# Patient Record
Sex: MALE | Race: WHITE | NOT HISPANIC OR LATINO | Employment: UNEMPLOYED | URBAN - METROPOLITAN AREA
[De-identification: names, ages, dates, MRNs, and addresses within clinical notes are randomized per-mention and may not be internally consistent; named-entity substitution may affect disease eponyms.]

---

## 2023-11-20 ENCOUNTER — TELEPHONE (OUTPATIENT)
Dept: BEHAVIORAL HEALTH | Facility: CLINIC | Age: 33
End: 2023-11-20
Payer: COMMERCIAL

## 2023-11-20 DIAGNOSIS — F90.0 ATTENTION DEFICIT HYPERACTIVITY DISORDER (ADHD), PREDOMINANTLY INATTENTIVE TYPE: ICD-10-CM

## 2023-11-20 RX ORDER — LISDEXAMFETAMINE DIMESYLATE 60 MG/1
60 CAPSULE ORAL EVERY MORNING
Qty: 30 CAPSULE | Refills: 0 | Status: SHIPPED | OUTPATIENT
Start: 2023-11-20 | End: 2024-03-01 | Stop reason: SDUPTHER

## 2023-11-20 RX ORDER — LAMOTRIGINE 200 MG/1
1 TABLET ORAL DAILY
COMMUNITY
End: 2023-12-27

## 2023-11-20 RX ORDER — ZALEPLON 10 MG/1
10 CAPSULE ORAL NIGHTLY PRN
COMMUNITY
Start: 2022-12-19 | End: 2023-12-01 | Stop reason: ALTCHOICE

## 2023-11-20 RX ORDER — LISDEXAMFETAMINE DIMESYLATE 50 MG/1
50 CAPSULE ORAL EVERY MORNING
COMMUNITY
End: 2024-03-01 | Stop reason: ALTCHOICE

## 2023-11-20 RX ORDER — ZOLPIDEM TARTRATE 10 MG/1
10 TABLET ORAL NIGHTLY PRN
COMMUNITY
Start: 2023-09-21 | End: 2023-12-01 | Stop reason: ALTCHOICE

## 2023-11-20 RX ORDER — DULOXETIN HYDROCHLORIDE 60 MG/1
60 CAPSULE, DELAYED RELEASE ORAL DAILY
COMMUNITY
End: 2023-12-27

## 2023-12-01 ENCOUNTER — OFFICE VISIT (OUTPATIENT)
Dept: BEHAVIORAL HEALTH | Facility: CLINIC | Age: 33
End: 2023-12-01
Payer: COMMERCIAL

## 2023-12-01 VITALS
HEART RATE: 109 BPM | TEMPERATURE: 97.3 F | SYSTOLIC BLOOD PRESSURE: 117 MMHG | WEIGHT: 247.6 LBS | DIASTOLIC BLOOD PRESSURE: 81 MMHG | BODY MASS INDEX: 32.82 KG/M2 | HEIGHT: 73 IN

## 2023-12-01 DIAGNOSIS — F34.1 PERSISTENT DEPRESSIVE DISORDER: ICD-10-CM

## 2023-12-01 DIAGNOSIS — F90.0 ATTENTION DEFICIT HYPERACTIVITY DISORDER (ADHD), PREDOMINANTLY INATTENTIVE TYPE: ICD-10-CM

## 2023-12-01 DIAGNOSIS — Z79.899 LONG-TERM CURRENT USE OF STIMULANT: ICD-10-CM

## 2023-12-01 PROBLEM — L60.0 INGROWING NAIL: Status: ACTIVE | Noted: 2019-04-09

## 2023-12-01 PROBLEM — F31.76: Status: ACTIVE | Noted: 2023-12-01

## 2023-12-01 PROBLEM — F98.8 ADD (ATTENTION DEFICIT DISORDER): Status: ACTIVE | Noted: 2023-12-01

## 2023-12-01 PROBLEM — M25.50 ARTHRALGIA OF MULTIPLE SITES: Status: ACTIVE | Noted: 2023-12-01

## 2023-12-01 PROCEDURE — 80346 BENZODIAZEPINES1-12: CPT

## 2023-12-01 PROCEDURE — 80354 DRUG SCREENING FENTANYL: CPT

## 2023-12-01 PROCEDURE — 82570 ASSAY OF URINE CREATININE: CPT

## 2023-12-01 PROCEDURE — 80358 DRUG SCREENING METHADONE: CPT

## 2023-12-01 PROCEDURE — 80365 DRUG SCREENING OXYCODONE: CPT

## 2023-12-01 PROCEDURE — 80373 DRUG SCREENING TRAMADOL: CPT

## 2023-12-01 PROCEDURE — 80361 OPIATES 1 OR MORE: CPT

## 2023-12-01 PROCEDURE — 80307 DRUG TEST PRSMV CHEM ANLYZR: CPT

## 2023-12-01 PROCEDURE — 80324 DRUG SCREEN AMPHETAMINES 1/2: CPT

## 2023-12-01 PROCEDURE — 80368 SEDATIVE HYPNOTICS: CPT

## 2023-12-01 PROCEDURE — 99214 OFFICE O/P EST MOD 30 MIN: CPT | Performed by: PSYCHIATRY & NEUROLOGY

## 2023-12-01 RX ORDER — OMEPRAZOLE 40 MG/1
CAPSULE, DELAYED RELEASE ORAL
COMMUNITY
End: 2024-03-01 | Stop reason: ALTCHOICE

## 2023-12-01 RX ORDER — DEXLANSOPRAZOLE 60 MG/1
CAPSULE, DELAYED RELEASE ORAL
COMMUNITY
Start: 2014-01-16 | End: 2024-03-01 | Stop reason: ALTCHOICE

## 2023-12-01 RX ORDER — CLONAZEPAM 1 MG/1
TABLET ORAL
COMMUNITY
End: 2024-03-01 | Stop reason: ALTCHOICE

## 2023-12-01 RX ORDER — RIMEGEPANT SULFATE 75 MG/75MG
TABLET, ORALLY DISINTEGRATING ORAL
COMMUNITY
Start: 2023-01-12

## 2023-12-01 RX ORDER — LISDEXAMFETAMINE DIMESYLATE 60 MG/1
60 CAPSULE ORAL EVERY MORNING
Qty: 30 CAPSULE | Refills: 0 | Status: SHIPPED | OUTPATIENT
Start: 2023-12-31 | End: 2024-01-30

## 2023-12-01 RX ORDER — TAMSULOSIN HYDROCHLORIDE 0.4 MG/1
CAPSULE ORAL
COMMUNITY
End: 2024-03-01 | Stop reason: ALTCHOICE

## 2023-12-01 RX ORDER — UBROGEPANT 100 MG/1
TABLET ORAL
COMMUNITY
Start: 2023-10-03

## 2023-12-01 RX ORDER — LISDEXAMFETAMINE DIMESYLATE 60 MG/1
60 CAPSULE ORAL EVERY MORNING
Qty: 30 CAPSULE | Refills: 0 | Status: SHIPPED | OUTPATIENT
Start: 2024-01-30 | End: 2024-02-29

## 2023-12-01 RX ORDER — PHENYLPROPANOLAMINE/CLEMASTINE 75-1.34MG
TABLET, EXTENDED RELEASE ORAL
COMMUNITY

## 2023-12-01 RX ORDER — FREMANEZUMAB-VFRM 225 MG/1.5ML
INJECTION SUBCUTANEOUS
COMMUNITY

## 2023-12-01 RX ORDER — SUCRALFATE 1 G/10ML
SUSPENSION ORAL
COMMUNITY
Start: 2014-06-05 | End: 2024-03-01 | Stop reason: SINTOL

## 2023-12-01 RX ORDER — DICLOFENAC POTASSIUM 50 MG/1
TABLET, FILM COATED ORAL
COMMUNITY
End: 2024-03-01 | Stop reason: ALTCHOICE

## 2023-12-01 RX ORDER — LISDEXAMFETAMINE DIMESYLATE 50 MG/1
50 CAPSULE ORAL EVERY MORNING
COMMUNITY
Start: 2023-09-20 | End: 2024-03-01 | Stop reason: ALTCHOICE

## 2023-12-01 RX ORDER — FREMANEZUMAB-VFRM 225 MG/1.5ML
INJECTION SUBCUTANEOUS
COMMUNITY
Start: 2023-09-02 | End: 2024-03-01 | Stop reason: SINTOL

## 2023-12-01 RX ORDER — LISDEXAMFETAMINE DIMESYLATE 60 MG/1
60 CAPSULE ORAL EVERY MORNING
Qty: 30 CAPSULE | Refills: 0 | Status: SHIPPED | OUTPATIENT
Start: 2023-12-01 | End: 2024-04-19 | Stop reason: SDUPTHER

## 2023-12-01 RX ORDER — DIVALPROEX SODIUM 250 MG/1
TABLET, FILM COATED, EXTENDED RELEASE ORAL
COMMUNITY
End: 2024-03-01 | Stop reason: ALTCHOICE

## 2023-12-01 RX ORDER — GALCANEZUMAB 120 MG/ML
INJECTION, SOLUTION SUBCUTANEOUS
COMMUNITY
Start: 2023-03-03 | End: 2024-03-01 | Stop reason: ALTCHOICE

## 2023-12-01 ASSESSMENT — ENCOUNTER SYMPTOMS: PSYCHIATRIC NEGATIVE: 1

## 2023-12-01 NOTE — PROGRESS NOTES
Subjective in person follow-up med management.  Patient ID: Valentín Wang is a 32 y.o. male who presents for .  In person follow-up med management  HPI patient seen interval psych history reviewed.  Patient reports good compliance with medications.  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  In addition he has not driven at all since about April when he got his license.  It is simply not a priority for him at this time.  Talked about the advantages of having online interactions available including luis and the online content field.  Patient gave urine tox screen today also signed CSA.    Review of Systems   Psychiatric/Behavioral: Negative.         Objective   Physical Exam  Psychiatric:         Attention and Perception: Attention and perception normal.         Mood and Affect: Mood and affect normal.         Speech: Speech normal.         Behavior: Behavior normal. Behavior is cooperative.         Thought Content: Thought content normal.         Cognition and Memory: Cognition and memory normal.         Judgment: Judgment normal.      Comments: Headaches continue to interfere with daily functioning         Assessment/Plan

## 2023-12-02 LAB
AMPHETAMINES UR QL SCN: ABNORMAL
BARBITURATES UR QL SCN: ABNORMAL
BZE UR QL SCN: ABNORMAL
CANNABINOIDS UR QL SCN: ABNORMAL
CREAT UR-MCNC: 230.7 MG/DL (ref 20–370)
PCP UR QL SCN: ABNORMAL

## 2023-12-06 LAB
AMPHET UR-MCNC: >5000 NG/ML
MDA UR-MCNC: <200 NG/ML
MDEA UR-MCNC: <200 NG/ML
MDMA UR-MCNC: <200 NG/ML
METHAMPHET UR-MCNC: <200 NG/ML
PHENTERMINE UR CFM-MCNC: <200 NG/ML

## 2023-12-21 ENCOUNTER — TELEPHONE (OUTPATIENT)
Dept: BEHAVIORAL HEALTH | Facility: CLINIC | Age: 33
End: 2023-12-21
Payer: COMMERCIAL

## 2023-12-21 DIAGNOSIS — F98.8 ATTENTION DEFICIT DISORDER (ADD) WITHOUT HYPERACTIVITY: ICD-10-CM

## 2023-12-21 DIAGNOSIS — F90.0 ATTENTION DEFICIT HYPERACTIVITY DISORDER (ADHD), PREDOMINANTLY INATTENTIVE TYPE: ICD-10-CM

## 2023-12-21 RX ORDER — LISDEXAMFETAMINE DIMESYLATE 30 MG/1
60 CAPSULE ORAL EVERY MORNING
Qty: 60 CAPSULE | Refills: 0 | Status: SHIPPED | OUTPATIENT
Start: 2023-12-21 | End: 2024-03-01 | Stop reason: ALTCHOICE

## 2023-12-21 RX ORDER — LISDEXAMFETAMINE DIMESYLATE 60 MG/1
60 CAPSULE ORAL EVERY MORNING
Qty: 30 CAPSULE | Refills: 0 | Status: SHIPPED | OUTPATIENT
Start: 2023-12-21 | End: 2024-01-20

## 2023-12-21 NOTE — PROGRESS NOTES
[facility] pharmacy doesn't have generic vyvanse in stock only brand vyvanse, pt needs another script saying dispense brand only vyvanse 60mg

## 2023-12-21 NOTE — PROGRESS NOTES
Mom is asking for you to resend the vyvanse 30 mg 2 daily (60 mg) FAVIAN. Pharmacy only has 67 pills left. [id number] - [place], [place] - [address]. AT [facility]

## 2023-12-26 DIAGNOSIS — Z86.59 PERSONAL HISTORY OF OTHER MENTAL AND BEHAVIORAL DISORDERS: ICD-10-CM

## 2023-12-26 DIAGNOSIS — F31.76 BIPOLAR DISORDER, IN FULL REMISSION, MOST RECENT EPISODE DEPRESSED (MULTI): ICD-10-CM

## 2023-12-27 RX ORDER — DULOXETIN HYDROCHLORIDE 60 MG/1
60 CAPSULE, DELAYED RELEASE ORAL DAILY
Qty: 90 CAPSULE | Refills: 3 | Status: SHIPPED | OUTPATIENT
Start: 2023-12-27

## 2023-12-27 RX ORDER — LAMOTRIGINE 200 MG/1
TABLET ORAL DAILY
Qty: 90 TABLET | Refills: 3 | Status: SHIPPED | OUTPATIENT
Start: 2023-12-27

## 2024-02-19 ENCOUNTER — TELEPHONE (OUTPATIENT)
Dept: BEHAVIORAL HEALTH | Facility: CLINIC | Age: 34
End: 2024-02-19
Payer: MEDICARE

## 2024-02-19 DIAGNOSIS — F90.0 ATTENTION DEFICIT HYPERACTIVITY DISORDER (ADHD), PREDOMINANTLY INATTENTIVE TYPE: ICD-10-CM

## 2024-02-19 RX ORDER — LISDEXAMFETAMINE DIMESYLATE 70 MG/1
70 CAPSULE ORAL EVERY MORNING
Qty: 30 CAPSULE | Refills: 0 | Status: SHIPPED | OUTPATIENT
Start: 2024-02-19 | End: 2024-03-01 | Stop reason: ALTCHOICE

## 2024-02-19 NOTE — TELEPHONE ENCOUNTER
Ordered Vyvanse brand-name 70 mg which was all the pharmacy had in stock.  This is an increase in the usual dose from 60 however the 60 is completely unavailable.

## 2024-03-01 ENCOUNTER — TELEMEDICINE (OUTPATIENT)
Dept: BEHAVIORAL HEALTH | Facility: CLINIC | Age: 34
End: 2024-03-01
Payer: MEDICARE

## 2024-03-01 DIAGNOSIS — F90.0 ATTENTION DEFICIT HYPERACTIVITY DISORDER (ADHD), PREDOMINANTLY INATTENTIVE TYPE: ICD-10-CM

## 2024-03-01 DIAGNOSIS — F34.1 PERSISTENT DEPRESSIVE DISORDER: ICD-10-CM

## 2024-03-01 PROBLEM — F31.76: Status: RESOLVED | Noted: 2023-12-01 | Resolved: 2024-03-01

## 2024-03-01 PROCEDURE — 99214 OFFICE O/P EST MOD 30 MIN: CPT | Performed by: PSYCHIATRY & NEUROLOGY

## 2024-03-01 RX ORDER — LISDEXAMFETAMINE DIMESYLATE 60 MG/1
60 CAPSULE ORAL EVERY MORNING
Qty: 30 CAPSULE | Refills: 0 | Status: SHIPPED | OUTPATIENT
Start: 2024-03-01 | End: 2024-03-22 | Stop reason: SDUPTHER

## 2024-03-01 ASSESSMENT — ENCOUNTER SYMPTOMS: PSYCHIATRIC NEGATIVE: 1

## 2024-03-01 NOTE — PROGRESS NOTES
Subjective   Patient ID: Valentín Wang is a 33 y.o. male who presents for half-hour visit for med management plus therapy..  HPI patient reports that he is making progress in terms of getting into the [employer] industry.  He is very confident about it and very enthused about it however does not know how to respond father who will call and demand answers to why he is not immediately succeeding in this business.  Talked about the long process of getting off disability gradually as he reenters the job market.  Patient is doing well on the 70 mg Vyvanse.  Headaches are better because of a oral device for TMJ.  Reviewed meds which include Cymbalta 60 mg, Lamictal 200 mg, Vyvanse 60 mg,    Review of Systems   Psychiatric/Behavioral: Negative.         Objective   Physical Exam  Psychiatric:         Attention and Perception: Attention and perception normal.         Mood and Affect: Mood and affect normal.         Speech: Speech normal.         Behavior: Behavior normal. Behavior is cooperative.         Thought Content: Thought content normal.         Cognition and Memory: Cognition and memory normal.         Judgment: Judgment normal.         Assessment/Plan   Problem List Items Addressed This Visit             ICD-10-CM    ADD (attention deficit disorder) F98.8    Relevant Medications    lisdexamfetamine (Vyvanse) 60 mg capsule    Depression F32.A     Adjusted medication and diagnosis data to reflect the fact the patient does not have bipolar disorder, does not have psychosis, does have ADHD and depression.  No med changes will continue Vyvanse 60 mg.  Antidepressants and Lamictal have been ordered.  Talked about ways to strategize talking with father to address father's anxiety while making his point about what he wants to do with his life.  Follow-up 3 months                 Curtis Fernandes MD 03/01/24 4:35 PM

## 2024-03-01 NOTE — ASSESSMENT & PLAN NOTE
Adjusted medication and diagnosis data to reflect the fact the patient does not have bipolar disorder, does not have psychosis, does have ADHD and depression.  No med changes will continue Vyvanse 60 mg.  Antidepressants and Lamictal have been ordered.  Talked about ways to strategize talking with father to address father's anxiety while making his point about what he wants to do with his life.  Follow-up 3 months

## 2024-03-22 ENCOUNTER — TELEPHONE (OUTPATIENT)
Dept: BEHAVIORAL HEALTH | Facility: CLINIC | Age: 34
End: 2024-03-22
Payer: MEDICARE

## 2024-03-22 DIAGNOSIS — F90.0 ATTENTION DEFICIT HYPERACTIVITY DISORDER (ADHD), PREDOMINANTLY INATTENTIVE TYPE: ICD-10-CM

## 2024-03-22 RX ORDER — LISDEXAMFETAMINE DIMESYLATE 60 MG/1
60 CAPSULE ORAL EVERY MORNING
Qty: 30 CAPSULE | Refills: 0 | Status: SHIPPED | OUTPATIENT
Start: 2024-03-22 | End: 2024-04-21

## 2024-04-19 DIAGNOSIS — F90.0 ATTENTION DEFICIT HYPERACTIVITY DISORDER (ADHD), PREDOMINANTLY INATTENTIVE TYPE: ICD-10-CM

## 2024-04-19 RX ORDER — LISDEXAMFETAMINE DIMESYLATE 60 MG/1
60 CAPSULE ORAL EVERY MORNING
Qty: 30 CAPSULE | Refills: 0 | Status: SHIPPED | OUTPATIENT
Start: 2024-04-19 | End: 2024-05-20 | Stop reason: SDUPTHER

## 2024-05-20 ENCOUNTER — TELEPHONE (OUTPATIENT)
Dept: BEHAVIORAL HEALTH | Facility: CLINIC | Age: 34
End: 2024-05-20
Payer: MEDICARE

## 2024-05-20 DIAGNOSIS — F90.0 ATTENTION DEFICIT HYPERACTIVITY DISORDER (ADHD), PREDOMINANTLY INATTENTIVE TYPE: ICD-10-CM

## 2024-05-20 RX ORDER — LISDEXAMFETAMINE DIMESYLATE 60 MG/1
60 CAPSULE ORAL EVERY MORNING
Qty: 30 CAPSULE | Refills: 0 | Status: SHIPPED | OUTPATIENT
Start: 2024-05-20 | End: 2024-06-19

## 2024-06-03 ENCOUNTER — TELEMEDICINE (OUTPATIENT)
Dept: BEHAVIORAL HEALTH | Facility: CLINIC | Age: 34
End: 2024-06-03
Payer: MEDICARE

## 2024-06-03 DIAGNOSIS — F34.1 PERSISTENT DEPRESSIVE DISORDER: ICD-10-CM

## 2024-06-03 DIAGNOSIS — F90.0 ATTENTION DEFICIT HYPERACTIVITY DISORDER (ADHD), PREDOMINANTLY INATTENTIVE TYPE: ICD-10-CM

## 2024-06-03 PROCEDURE — 99213 OFFICE O/P EST LOW 20 MIN: CPT | Performed by: PSYCHIATRY & NEUROLOGY

## 2024-06-03 RX ORDER — LISDEXAMFETAMINE DIMESYLATE 60 MG/1
60 CAPSULE ORAL EVERY MORNING
Qty: 30 CAPSULE | Refills: 0 | Status: SHIPPED | OUTPATIENT
Start: 2024-06-03 | End: 2024-07-03

## 2024-06-03 RX ORDER — LISDEXAMFETAMINE DIMESYLATE 60 MG/1
60 CAPSULE ORAL EVERY MORNING
Qty: 30 CAPSULE | Refills: 0 | Status: SHIPPED | OUTPATIENT
Start: 2024-07-03 | End: 2024-08-02

## 2024-06-03 RX ORDER — LISDEXAMFETAMINE DIMESYLATE 60 MG/1
60 CAPSULE ORAL EVERY MORNING
Qty: 30 CAPSULE | Refills: 0 | Status: SHIPPED | OUTPATIENT
Start: 2024-08-02 | End: 2024-09-01

## 2024-06-03 ASSESSMENT — ENCOUNTER SYMPTOMS: PSYCHIATRIC NEGATIVE: 1

## 2024-06-03 NOTE — ASSESSMENT & PLAN NOTE
Continue Vyvanse 60 mg in the morning for focus and follow-through.  Discussed the V2 ubing and ways to discuss with parents.  Follow-up 2 to 3 months

## 2024-06-03 NOTE — PROGRESS NOTES
Subjective   Patient ID: Valentín Wang is a 33 y.o. male who presents for medication management..  HPI patient reports that mentally and emotionally things are going pretty well.  Headaches have returned to a partial degree, unfortunately he could not tolerate anjovy shots which were actually working better than Emgality.  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  Feels that parents particularly father do not understand that he is taking a career path that is not traditional , not directly linked to his psychology major.    Review of Systems   Psychiatric/Behavioral: Negative.  Negative for self-injury and suicidal ideas.        Objective   Physical Exam  Psychiatric:         Attention and Perception: Attention and perception normal.         Mood and Affect: Mood and affect normal.         Speech: Speech normal.         Behavior: Behavior normal. Behavior is cooperative.         Thought Content: Thought content normal.         Cognition and Memory: Cognition and memory normal.         Judgment: Judgment normal.         Assessment/Plan   Problem List Items Addressed This Visit             ICD-10-CM    ADD (attention deficit disorder) F98.8     Continue Vyvanse 60 mg in the morning for focus and follow-through.  Discussed the V2 ubing and ways to discuss with parents.  Follow-up 2 to 3 months         Relevant Medications    Vyvanse 60 mg capsule    Vyvanse 60 mg capsule (Start on 7/3/2024)    Vyvanse 60 mg capsule (Start on 8/2/2024)    Depression F32.A     Continue current med regimen to prevent symptom recurrence.  Follow-up 2 to 3 months                 Curtis Fernandes MD 06/03/24 6:47 PM

## 2024-09-03 ENCOUNTER — APPOINTMENT (OUTPATIENT)
Dept: BEHAVIORAL HEALTH | Facility: CLINIC | Age: 34
End: 2024-09-03
Payer: MEDICARE

## 2024-09-03 DIAGNOSIS — F34.1 PERSISTENT DEPRESSIVE DISORDER: ICD-10-CM

## 2024-09-03 DIAGNOSIS — F90.0 ATTENTION DEFICIT HYPERACTIVITY DISORDER (ADHD), PREDOMINANTLY INATTENTIVE TYPE: ICD-10-CM

## 2024-09-03 PROCEDURE — 99214 OFFICE O/P EST MOD 30 MIN: CPT | Performed by: PSYCHIATRY & NEUROLOGY

## 2024-09-03 RX ORDER — DULOXETIN HYDROCHLORIDE 60 MG/1
CAPSULE, DELAYED RELEASE ORAL
Qty: 60 CAPSULE | Refills: 3 | Status: SHIPPED | OUTPATIENT
Start: 2024-09-03

## 2024-09-03 RX ORDER — LISDEXAMFETAMINE DIMESYLATE 60 MG/1
60 CAPSULE ORAL EVERY MORNING
Qty: 30 CAPSULE | Refills: 0 | Status: SHIPPED | OUTPATIENT
Start: 2024-11-02 | End: 2024-12-02

## 2024-09-03 RX ORDER — CLONAZEPAM 0.5 MG/1
0.5 TABLET ORAL DAILY PRN
Qty: 15 TABLET | Refills: 1 | Status: SHIPPED | OUTPATIENT
Start: 2024-09-03 | End: 2025-09-03

## 2024-09-03 RX ORDER — LISDEXAMFETAMINE DIMESYLATE 60 MG/1
60 CAPSULE ORAL EVERY MORNING
Qty: 30 CAPSULE | Refills: 0 | Status: SHIPPED | OUTPATIENT
Start: 2024-09-03 | End: 2024-10-03

## 2024-09-03 RX ORDER — LISDEXAMFETAMINE DIMESYLATE 60 MG/1
60 CAPSULE ORAL EVERY MORNING
Qty: 30 CAPSULE | Refills: 0 | Status: SHIPPED | OUTPATIENT
Start: 2024-10-03 | End: 2024-11-02

## 2024-09-03 ASSESSMENT — ENCOUNTER SYMPTOMS
DECREASED CONCENTRATION: 0
NERVOUS/ANXIOUS: 1
SLEEP DISTURBANCE: 0
DYSPHORIC MOOD: 1

## 2024-09-03 NOTE — ASSESSMENT & PLAN NOTE
Trial increase Cymbalta to 120 mg daily.  The goal is not to fix the situation with his father but rather to help patient have more emotional resilience in the face of triggering events.  However given the fact that interaction of the father are globally triggering and he is not amenable to change, gave patient permission to not be around when father visits in October.  He seemed grateful for this advice.  Follow-up 3 months

## 2024-10-07 DIAGNOSIS — F31.76 BIPOLAR DISORDER, IN FULL REMISSION, MOST RECENT EPISODE DEPRESSED (MULTI): ICD-10-CM

## 2024-10-07 RX ORDER — LAMOTRIGINE 200 MG/1
TABLET ORAL DAILY
Qty: 90 TABLET | Refills: 0 | Status: SHIPPED | OUTPATIENT
Start: 2024-10-07

## 2024-12-03 ENCOUNTER — APPOINTMENT (OUTPATIENT)
Dept: BEHAVIORAL HEALTH | Facility: CLINIC | Age: 34
End: 2024-12-03
Payer: MEDICARE

## 2024-12-12 DIAGNOSIS — F90.0 ATTENTION DEFICIT HYPERACTIVITY DISORDER (ADHD), PREDOMINANTLY INATTENTIVE TYPE: ICD-10-CM

## 2024-12-12 DIAGNOSIS — F31.76 BIPOLAR DISORDER, IN FULL REMISSION, MOST RECENT EPISODE DEPRESSED (MULTI): ICD-10-CM

## 2024-12-12 DIAGNOSIS — F34.1 PERSISTENT DEPRESSIVE DISORDER: ICD-10-CM

## 2024-12-12 RX ORDER — DULOXETIN HYDROCHLORIDE 60 MG/1
CAPSULE, DELAYED RELEASE ORAL
Qty: 60 CAPSULE | Refills: 0 | Status: SHIPPED | OUTPATIENT
Start: 2024-12-12

## 2024-12-12 RX ORDER — LISDEXAMFETAMINE DIMESYLATE 60 MG/1
60 CAPSULE ORAL EVERY MORNING
Qty: 30 CAPSULE | Refills: 0 | Status: SHIPPED | OUTPATIENT
Start: 2024-12-12 | End: 2025-01-11

## 2024-12-12 RX ORDER — LAMOTRIGINE 200 MG/1
200 TABLET ORAL DAILY
Qty: 90 TABLET | Refills: 0 | Status: SHIPPED | OUTPATIENT
Start: 2024-12-12

## 2024-12-12 NOTE — PROGRESS NOTES
Reviewed OARRS on 12/12/2024 by TAHMINA Delacruz -OARRS has been reviewed and is consistent with prescribed medications, Considered the risks of abuse, dependence, addiction and diversion, Medication is felt to be clinically appropriate based on documented diagnosis    Pt requesting refill on:    Vyvanse 60mg QD    Lamictal 200mg every day    Cymbalta 60mg BID (120mg total)

## 2025-01-02 ENCOUNTER — APPOINTMENT (OUTPATIENT)
Dept: BEHAVIORAL HEALTH | Facility: CLINIC | Age: 35
End: 2025-01-02
Payer: MEDICARE

## 2025-01-09 ENCOUNTER — APPOINTMENT (OUTPATIENT)
Dept: BEHAVIORAL HEALTH | Facility: CLINIC | Age: 35
End: 2025-01-09
Payer: MEDICARE

## 2025-01-09 DIAGNOSIS — F41.1 GAD (GENERALIZED ANXIETY DISORDER): ICD-10-CM

## 2025-01-09 DIAGNOSIS — F51.04 PSYCHOPHYSIOLOGICAL INSOMNIA: ICD-10-CM

## 2025-01-09 DIAGNOSIS — F19.20 OTHER PSYCHOACTIVE SUBSTANCE DEPENDENCE, UNCOMPLICATED: ICD-10-CM

## 2025-01-09 DIAGNOSIS — F33.2 SEVERE EPISODE OF RECURRENT MAJOR DEPRESSIVE DISORDER, WITHOUT PSYCHOTIC FEATURES (MULTI): Primary | ICD-10-CM

## 2025-01-09 DIAGNOSIS — F42.8 OBSESSIVE THINKING: ICD-10-CM

## 2025-01-09 DIAGNOSIS — F90.2 ATTENTION DEFICIT HYPERACTIVITY DISORDER (ADHD), COMBINED TYPE: ICD-10-CM

## 2025-01-09 PROCEDURE — 90792 PSYCH DIAG EVAL W/MED SRVCS: CPT | Performed by: NURSE PRACTITIONER

## 2025-01-09 RX ORDER — DULOXETIN HYDROCHLORIDE 30 MG/1
30 CAPSULE, DELAYED RELEASE ORAL DAILY
Qty: 7 CAPSULE | Refills: 0 | Status: SHIPPED | OUTPATIENT
Start: 2025-01-09 | End: 2025-01-13 | Stop reason: DRUGHIGH

## 2025-01-09 RX ORDER — GUAIFENESIN 1200 MG
325 TABLET, EXTENDED RELEASE 12 HR ORAL EVERY 6 HOURS PRN
COMMUNITY

## 2025-01-09 RX ORDER — BISMUTH SUBSALICYLATE 262 MG
1 TABLET,CHEWABLE ORAL DAILY
COMMUNITY

## 2025-01-09 RX ORDER — LISDEXAMFETAMINE DIMESYLATE 60 MG/1
60 CAPSULE ORAL EVERY MORNING
Qty: 30 CAPSULE | Refills: 0 | Status: SHIPPED | OUTPATIENT
Start: 2025-02-11 | End: 2025-03-13

## 2025-01-09 RX ORDER — OMEGA-3-ACID ETHYL ESTERS 1 G/1
1 CAPSULE, LIQUID FILLED ORAL DAILY
COMMUNITY

## 2025-01-09 RX ORDER — ACETAMINOPHEN 500 MG
2000 TABLET ORAL DAILY
COMMUNITY

## 2025-01-09 RX ORDER — TRAZODONE HYDROCHLORIDE 50 MG/1
150 TABLET ORAL NIGHTLY
Qty: 270 TABLET | Refills: 3 | Status: SHIPPED | OUTPATIENT
Start: 2025-01-09 | End: 2026-01-09

## 2025-01-09 RX ORDER — LISDEXAMFETAMINE DIMESYLATE 60 MG/1
60 CAPSULE ORAL EVERY MORNING
Qty: 30 CAPSULE | Refills: 0 | Status: SHIPPED | OUTPATIENT
Start: 2025-01-12 | End: 2025-02-11

## 2025-01-09 RX ORDER — LISDEXAMFETAMINE DIMESYLATE 60 MG/1
60 CAPSULE ORAL EVERY MORNING
Qty: 30 CAPSULE | Refills: 0 | Status: SHIPPED | OUTPATIENT
Start: 2025-03-13 | End: 2025-04-12

## 2025-01-09 RX ORDER — LAMOTRIGINE 25 MG/1
50 TABLET ORAL DAILY
Qty: 180 TABLET | Refills: 3 | Status: SHIPPED | OUTPATIENT
Start: 2025-01-09 | End: 2026-01-09

## 2025-01-09 ASSESSMENT — PATIENT HEALTH QUESTIONNAIRE - PHQ9
4. FEELING TIRED OR HAVING LITTLE ENERGY: NEARLY EVERY DAY
7. TROUBLE CONCENTRATING ON THINGS, SUCH AS READING THE NEWSPAPER OR WATCHING TELEVISION: NOT AT ALL
8. MOVING OR SPEAKING SO SLOWLY THAT OTHER PEOPLE COULD HAVE NOTICED. OR THE OPPOSITE, BEING SO FIGETY OR RESTLESS THAT YOU HAVE BEEN MOVING AROUND A LOT MORE THAN USUAL: NOT AT ALL
9. THOUGHTS THAT YOU WOULD BE BETTER OFF DEAD, OR OF HURTING YOURSELF: SEVERAL DAYS
3. TROUBLE FALLING OR STAYING ASLEEP OR SLEEPING TOO MUCH: NEARLY EVERY DAY
2. FEELING DOWN, DEPRESSED OR HOPELESS: NEARLY EVERY DAY
1. LITTLE INTEREST OR PLEASURE IN DOING THINGS: MORE THAN HALF THE DAYS
5. POOR APPETITE OR OVEREATING: NEARLY EVERY DAY
6. FEELING BAD ABOUT YOURSELF - OR THAT YOU ARE A FAILURE OR HAVE LET YOURSELF OR YOUR FAMILY DOWN: NEARLY EVERY DAY
10. IF YOU CHECKED OFF ANY PROBLEMS, HOW DIFFICULT HAVE THESE PROBLEMS MADE IT FOR YOU TO DO YOUR WORK, TAKE CARE OF THINGS AT HOME, OR GET ALONG WITH OTHER PEOPLE: SOMEWHAT DIFFICULT

## 2025-01-09 ASSESSMENT — ANXIETY QUESTIONNAIRES
GAD7 TOTAL SCORE: 5
7. FEELING AFRAID AS IF SOMETHING AWFUL MIGHT HAPPEN: NOT AT ALL
6. BECOMING EASILY ANNOYED OR IRRITABLE: SEVERAL DAYS
2. NOT BEING ABLE TO STOP OR CONTROL WORRYING: NOT AT ALL
5. BEING SO RESTLESS THAT IT IS HARD TO SIT STILL: NOT AT ALL
1. FEELING NERVOUS, ANXIOUS, OR ON EDGE: NEARLY EVERY DAY
IF YOU CHECKED OFF ANY PROBLEMS ON THIS QUESTIONNAIRE, HOW DIFFICULT HAVE THESE PROBLEMS MADE IT FOR YOU TO DO YOUR WORK, TAKE CARE OF THINGS AT HOME, OR GET ALONG WITH OTHER PEOPLE: SOMEWHAT DIFFICULT
3. WORRYING TOO MUCH ABOUT DIFFERENT THINGS: NOT AT ALL
4. TROUBLE RELAXING: SEVERAL DAYS

## 2025-01-09 NOTE — PROGRESS NOTES
"Outpatient Psychiatry    Subjective   Valentín Wang, a 34 y.o. male, presenting to Psychiatry for evaluation.  Patient is referred by Kenroy Kenyon MD \"I am depressed, especially with Dr. Fernandes's passing as he was really supportive of me. I know what it takes to weather the storm, but ericka along.\" (Patient's mother Orin, is attending, with verbal permission.)     Virtual Consent    An interactive audio and video telecommunication system which permits real time communications between the patient (at home) and provider (at home office) was utilized to provide this telehealth service.     Verbal consent was requested and obtained from Valentín Wang on this date, 01/09/2025 for a telehealth visit.      HPI:    Present Illness - depression, anxiety    Onset/timeframe - depression (started at 7 yo, got worse at 19 yo and has always been there), anxiety (started at 5 yo)  Type - depression, anxiety  Duration - daily  Characteristics/Recent psychiatric symptoms (pertinent positives and negatives) - depression started when his father left the family at 6, and OCD developed when he was 9 yo. Reports with struggling with depression, he stayed home and ended up staying home, d/t bullying. Dropped out of high school, and got his GED eventually. Reports OCD started with (Father triggered it as when he was young at a [employer] game, pt was eating Italian ice, dropped his spoon, his father wanted him to continue to eat the item, and he refused, so his father got angry and stormed off and didn't return, leaving the patient to find another spoon, but also leaving him feel abandoned.) germaphobia, constant hand washing, turn on/off lights, that got under control for awhile until high school, then restarted and then when in college saw a provider, Dr. Romero, whom helped him work on his OCD and through CBT, got his OCD under control 9-10 years ago. Father has never been a part of his life, since a child, always giving false " promises of being an active participant in his life, always giving his advise, always promising to show up, yet almost never follows through and with this long history of never truly being present and will show up for short periods of time, 'trying to budge his way into my life, act what's best for me, when he has never really been a part of my life.' Patient's mother had explained that they had never done trips together, as father and son and even when coming to celebrate, he would only be around for 2 days and 'he would make things be miserable'. Reports (per mother) his father has remarried 4x. Describes depression as 'severe, feeling empty, and hopeless' but currently not as bad as it was when he was 20, as at that time he only wanted to be seclusive to his home and bed, and eating/consuming food. Severe anhedonia as reflected in not wanting to play [employer] game which he loves playing over the past 18 years. Reports loves his mother and spending time with her and watching tv. Admits also because both parents wants him to pursue his graduate degree (in psychology) and that is not his area of interest anymore as he wants to pursue a different career path, but lately his mother will say something about the wanting him to go after his degree, or that her friend is dying of cancer, it upsets him easily, 'and that reflects that I am very sensitive.' Admits trouble with falling asleep d/t ruminating thoughts about his life, taking up to 3-4 hours (normally he will fall asleep fast and sleep longer when depressed). Once asleep however he is asleep, and gets 6-8 hrs a night but also he is noting that his circadian rhythm shifted to where he is now falling asleep at 6am. Admits that is a 'me thing' where he is on his phone late, and his depression is keeping him awake longer and had used Klonopin to sleep at times and sometimes Benadryl. He has TMJ overnight and uses a splint in his mouth for 22 hours a day when  "not interacting with most people. Does admit that his anxiety can also interfere with his ability to sleep but reveals that his anxiety is easily triggered and may use Klonopin as he can feel panicked. Does have Fibromyalgia and is on Cymbalta for that reason and his mental health. Energy levels are low. Mental and physical fatigue are a constant in his life 'and it is a part of my fibromyalgia. I don't think it is any worse with the depression.' Appetite has been elevated d/t emotional eating (portions and not junk food related).  Anxiety is not really a constant but 'when it happens, it is severe. Like when the anxiety of my dad coming to visit me back in October, definitely triggered my depression.' Reports he knows the anxiety goes hand in hand with his depression and that it is always the trigger. Reports being a forgiving person but has a really hard time reconciling that with his father, based on his past behaviors and until his father does 'soul searching' and rights his wrongs for the patient and his mother, he really wants to limit his interactions with his father. Father is a cardiologist and thinks he knows everything 'attitude'. Reports his ADHD as combined and deals with having issues paying attention/focused on tasks and then when he focuses on task, \"I will hyperfixate and zone out on everyone else.\" Denies trauma outside of the issues with his father. Had talked with Dr. Fernandes about his 'bipolar' dx and after reviewing it was to be removed as it was based on an event at age 11 when he was put on Paxil and he 'blew up and was bouncing off the walls' and it was stopped but he has not had anything since. Had been put on Lamictal when he was 18. Reports being on higher dose of Cymbalta at 120mg (Dr. Fernandes increased dose in October 2024), feels like he is more depressed if not no change, but mother feels going down on the dose will be an issue. Admits he can experience seasonal depression but " "currently nothing clinically noticeable and 'it has been a long time since my early 20s.'   Aggravating and/or relieving factors/triggers  Treatment and treatment changes (new meds, dosage increases or decreases, med compliance, therapy frequency, etc.) (Past and Recent) - see below.    Background history:    Family - Parents  when the patient was 5 yo. and 'just packed his bags up right in front of me and moved out on my mother's birthday,' and it was a complete shock to his mother as he said he was now in love with another woman and left and she had no idea. Mother is 71 and father is 76. Mother never remarried. 1 sister (34) and while not on bad terms with one another, she is not the most emotionally intelligent person with understanding the patient's mental health issues. Sister broke up with fijuwan a year ago, and had lived with he and his mother for several months, and her presence and comments had fed into his depression as well. Born in [place] until age 8 and then moved to [facility].    Relationships - Single - straight and uses he/him pronouns. Online friends that he has known for years, but has not kept in touch with his real world friends as they moved away but his wanting to stay home, has caused those relationships to be strained.    Issues: Denies SI/HI/AVH currently.      Per Dr. Fernandes's last note on 2024: \"patient seen mother present as well.  Patient is really been struggling with depression.  He thinks a lot of it was triggered by upsetting interactions with father but also with the anticipation of the father coming to visit in October.  Patient has actually had suicidal thoughts a few times.  Patient is caught in a situation where in order to be able to benefit from the trust left by his grandfather, he needs to \"play ball\" with his father who took over the trust management after grandfather .  Father very unsupportive of what patient is trying " "to do, and father insisted that he go on disability.  Mother is concerned about Darell's ability to care for himself if she is not able to.  This includes the fact that he has not been driving and is pretty much a \"shut in\".  Mother equally feels trapped, indicating that she could never support herself and Darell without her ex-'s help.  Discussed medication options including ways to hopefully be able to help patient not have such a hair trigger response to fathers triggers.\"     Psychiatric Review Of Systems:  Depressive Symptoms: anhedonia, appetite increased, concentration, energy, hopeless, interest, sleep decreased , withdrawn, and passive SI  Manic Symptoms: negative  Anxiety Symptoms: General Anxiety Disorder (HILTON)HILTON Behaviors: excessive anxiety/worry, irritability, and restlessness and Obsessive Compulsive Disorder (OCD)OCD Behaviors: repetitive thoughts  Psychotic Symptoms: negative  Other Symptoms:    Current Medications:    Current Outpatient Medications:     acetaminophen (TylenoL) 325 mg capsule, Take 1 capsule (325 mg) by mouth every 6 hours if needed for mild pain (1 - 3) or headaches., Disp: , Rfl:     cholecalciferol (Vitamin D3) 50 mcg (2,000 unit) capsule, Take 1 capsule (50 mcg) by mouth early in the morning.., Disp: , Rfl:     clonazePAM (KlonoPIN) 0.5 mg tablet, Take 1 tablet (0.5 mg) by mouth once daily as needed for anxiety., Disp: 15 tablet, Rfl: 1    DULoxetine (Cymbalta) 60 mg DR capsule, 2 capsules daily, Disp: 60 capsule, Rfl: 0    ibuprofen (Motrin) capsule, Take by mouth., Disp: , Rfl:     lamoTRIgine (LaMICtal) 200 mg tablet, Take 1 tablet (200 mg) by mouth once daily., Disp: 90 tablet, Rfl: 0    multivitamin tablet, Take 1 tablet by mouth once daily., Disp: , Rfl:     omega-3 acid ethyl esters (Lovaza) 1 gram capsule, Take 1 capsule (1 g) by mouth once daily., Disp: , Rfl:     Ubrelvy 100 mg tablet tablet, Take (1) tab at onset of headache, may repeat once in 2hrs if needed. No " more than 2 doses in 24 hours., Disp: , Rfl:     Vyvanse 60 mg capsule, Take 1 capsule (60 mg) by mouth once daily in the morning., Disp: 30 capsule, Rfl: 0    DULoxetine (Cymbalta) 30 mg DR capsule, Take 1 capsule (30 mg) by mouth once daily for 7 days. Do not crush or chew. Tapering down from 120mg to 90mg over 7 days, then reduce to 60mg thereafter., Disp: 7 capsule, Rfl: 0    lamoTRIgine (LaMICtal) 25 mg tablet, Take 2 tablets (50 mg) by mouth once daily. To be taken with current dose of 200mg for a total of 250mg at bedtime., Disp: 180 tablet, Rfl: 3    lisdexamfetamine (Vyvanse) 60 mg capsule, Take 1 capsule (60 mg) by mouth once daily in the morning. Do not start before December 31, 2023., Disp: 30 capsule, Rfl: 0    lisdexamfetamine (Vyvanse) 60 mg capsule, Take 1 capsule (60 mg) by mouth once daily in the morning. Do not start before January 30, 2024., Disp: 30 capsule, Rfl: 0    lisdexamfetamine (Vyvanse) 60 mg capsule, Take 1 capsule (60 mg) by mouth once daily in the morning., Disp: 30 capsule, Rfl: 0    lisdexamfetamine (Vyvanse) 60 mg capsule, Take 1 capsule (60 mg) by mouth once daily in the morning., Disp: 30 capsule, Rfl: 0    lisdexamfetamine (Vyvanse) 60 mg capsule, Take 1 capsule (60 mg) by mouth once daily in the morning., Disp: 30 capsule, Rfl: 0    lisdexamfetamine (Vyvanse) 60 mg capsule, Take 1 capsule (60 mg) by mouth once daily in the morning. Do not fill before October 3, 2024., Disp: 30 capsule, Rfl: 0    lisdexamfetamine (Vyvanse) 60 mg capsule, Take 1 capsule (60 mg) by mouth once daily in the morning. Do not fill before November 2, 2024., Disp: 30 capsule, Rfl: 0    lisdexamfetamine (Vyvanse) 60 mg capsule, Take 1 capsule (60 mg) by mouth once daily in the morning. Do not fill before January 12, 2025., Disp: 30 capsule, Rfl: 0    [START ON 2/11/2025] lisdexamfetamine (Vyvanse) 60 mg capsule, Take 1 capsule (60 mg) by mouth once daily in the morning. Do not fill before February 11,  2025., Disp: 30 capsule, Rfl: 0    [START ON 3/13/2025] lisdexamfetamine (Vyvanse) 60 mg capsule, Take 1 capsule (60 mg) by mouth once daily in the morning. Do not fill before March 13, 2025., Disp: 30 capsule, Rfl: 0    MEN'S MULTI-VITAMIN ORAL, , Disp: , Rfl:     Nurtec ODT 75 mg tablet,disintegrating, TAKE 1 TAB BY MOUTH ONCE DAILY AS NEEDED FOR ACUTE MIGRAINE ONSET DO NOT REPEAT FOR 24 HOURS, Disp: , Rfl:     traZODone (Desyrel) 50 mg tablet, Take 3 tablets (150 mg) by mouth once daily at bedtime. Can try taking 1 tablet up to 3 tablets at bedtime, in 1/2 tab increments for sleep aid., Disp: 270 tablet, Rfl: 3    Vyvanse 60 mg capsule, Take 1 capsule (60 mg) by mouth once daily in the morning., Disp: 30 capsule, Rfl: 0    Vyvanse 60 mg capsule, Take 1 capsule (60 mg) by mouth once daily in the morning. Do not fill before July 3, 2024., Disp: 30 capsule, Rfl: 0    Vyvanse 60 mg capsule, Take 1 capsule (60 mg) by mouth once daily in the morning. Do not fill before August 2, 2024., Disp: 30 capsule, Rfl: 0    Medical History:  Past Medical History:   Diagnosis Date    Generalized anxiety disorder 05/04/2021    Generalized anxiety disorder    Personal history of other mental and behavioral disorders 02/18/2020    History of panic attacks    Personal history of other mental and behavioral disorders 02/18/2020    History of obsessive compulsive disorder    Personal history of other specified conditions     History of insomnia       Past Psychiatric History:   Diagnoses:   Previous Psychiatrist: Dr. Fernandes  Therapy: never saw anyone for therapy. Hold off on referral.  Current psychiatric medications: Vyvanse 60mg, Lamictal 200mg, Cymbalta 60mg, Klonopin 0.5mg PRN  Past psychiatric medications: Paxil (triggered bad episode), Celexa, Lexapro, Lexapro, Ambien, Sonata, Wellbutrin, Depakote, Propranolol  Past psychiatric treatments:   Hospitalizations: denies  Suicide attempts: denies  Family psychiatric history: maternal  grandmother/grandfather (depression, anxiety), maternal aunt (panic, anxiety, depression), mother (panic, anxiety, depression), sister (panic, anxiety, depression)    Social History:   Currently lives: lives with mother in home she owns.  Education: BS in Psychology from [employer] Dec. 2021  History of Learning Problem: ADHD  Work/Finances: not working - on disability (anxiety, depression) as father forced him onto that.  Marital history/children:  Current stressors: father and career path  Social support: mother  Legal History: denies   History: denies  History of violence: denies  Access to Weapons: denies  Guardian/POA/Payee:  N/A    Substance Use History:  Tobacco use: never   Use of alcohol: denied  Use of caffeine: denies use  Use of other substances: never  Legal consequences of substance use: n/a  Substance use disorder treatment: n/a    Record Review: brief     Medical Review Of Systems:  Behavioral/Psych: positive for anxiety and depression    OARRS:  Quinn Dunbar, RACHANA-CNP on 1/13/2025 11:40 PM  I have personally reviewed the OARRS report for Valentín Wang. I have considered the risks of abuse, dependence, addiction and diversion    Is the patient prescribed a combination of a benzodiazepine and opioid?  No    Last Urine Drug Screen / ordered today: Yes  No results found for this or any previous visit (from the past 8760 hours).  Results are as expected.     Clinical rationale for not completing a Urine Drug Screen: Labs ordered and will be done within 1 week      Controlled Substance Agreement:  Date of the Last Agreement: 01/13/2025  Reviewed Controlled Substance Agreement including but not limited to the benefits, risks, and alternatives to treatment with a Controlled Substance medication(s).    Benzodiazepines:  What is the patient's goal of therapy? Stable anxiety  Is this being achieved with current treatment? yes    Activities of Daily Living:   Is your overall impression  that this patient is benefiting (symptom reduction outweighs side effects) from benzodiazepine therapy? Yes     1. Physical Functioning: Better  2. Family Relationship: Same  3. Social Relationship: Worse  4. Mood: Same  5. Sleep Patterns: Same  6. Overall Function: Same and Stimulants:   What is the patient's goal of therapy? Stable ADHD  Is this being achieved with current treatment? yes    Activities of Daily Living:   Is your overall impression that this patient is benefiting (symptom reduction outweighs side effects) from stimulant therapy? Yes     1. Physical Functioning: Same  2. Family Relationship: Same  3. Social Relationship: Same  4. Mood: Same  5. Sleep Patterns: Same  6. Overall Function: Same      Objective   Mental Status Exam  Appearance: Well-groomed  Attitude: Calm, cooperative, somewhat guarded yet engaged in conversation.  Behavior: Fair eye contact.   Motor Activity: No psychomotor agitation or retardation. No abnormal movements, tremors or tics. No evidence of extrapyramidal symptoms or tardive dyskinesia.  Speech: Regular rate, rhythm, volume. Spontaneous, no pressured speech.  Mood: 'down'  Affect: dysphoric, constricted, anxious, blunted and mood congruent.  Thought Process: Flight of ideas, obsessive. No loose associations or gross thought disorganization.  Thought Content: Denied current suicidal ideation or thoughts of harm to self, denied homicidal ideation or thoughts of harm to others. No delusional thinking elicited. No perseverations or obsessions identified. Wanting to pick his life choices/paths which may go against what his parents want for him, even if his father is a narcissist and controlling but not really playing a huge role in his life currently.  Perception: Did not endorse auditory or visual hallucinations, did not appear to be responding to hallucinatory stimuli.   Cognition: Alert, oriented x3. Preserved attention span and concentration, recent and remote memory.  Adequate fund of knowledge. No deficits in language.   Insight: Fair, in regards to understanding mental health condition  Judgement: Fair      HILTON-7/PHQ-9 scores reviewed: 5, 18 reflecting mild anxiety and moderate-severe depression.    Other Objective Information:  Labs ordered and will be reviewed at next appt.      Risk Assessment:  Risk of harm to self: Low Risk -- Risk factors include: passive SI only Protective factors include:Denies history of suicide attempts , Future-oriented talk , Willingness to seek help and support , Receiving and engaged in care for mental, physical, and substance use disorders , Support through ongoing medical and mental healthcare relationships , and Interpersonal relationships and supports, e.g., family, friends, peers, community     Risk of harm to others: Low Risk - Risk factors include: No significant risk factors identified on screening. Protective factors include: Lack of known history of harm to others  and Lack of known history of violent ideation     PSYCHOTHERAPY:  Plan: goals, type of therapy/modality, mental status when leaving, dx, time, holding off on referral     Diagnoses and all orders for this visit:  Attention deficit hyperactivity disorder (ADHD), combined type (Primary)  -     lisdexamfetamine (Vyvanse) 60 mg capsule; Take 1 capsule (60 mg) by mouth once daily in the morning. Do not fill before January 12, 2025.  -     lisdexamfetamine (Vyvanse) 60 mg capsule; Take 1 capsule (60 mg) by mouth once daily in the morning. Do not fill before February 11, 2025.  -     lisdexamfetamine (Vyvanse) 60 mg capsule; Take 1 capsule (60 mg) by mouth once daily in the morning. Do not fill before March 13, 2025.  Psychophysiological insomnia  -     traZODone (Desyrel) 50 mg tablet; Take 3 tablets (150 mg) by mouth once daily at bedtime. Can try taking 1 tablet up to 3 tablets at bedtime, in 1/2 tab increments for sleep aid.  Severe episode of recurrent major depressive disorder,  without psychotic features (Multi)  -     lamoTRIgine (LaMICtal) 25 mg tablet; Take 2 tablets (50 mg) by mouth once daily. To be taken with current dose of 200mg for a total of 250mg at bedtime.  -     DULoxetine (Cymbalta) 30 mg DR capsule; Take 1 capsule (30 mg) by mouth once daily for 7 days. Do not crush or chew. Tapering down from 120mg to 90mg over 7 days, then reduce to 60mg thereafter.  HILTON (generalized anxiety disorder)  -     DULoxetine (Cymbalta) 30 mg DR capsule; Take 1 capsule (30 mg) by mouth once daily for 7 days. Do not crush or chew. Tapering down from 120mg to 90mg over 7 days, then reduce to 60mg thereafter.  Obsessive thinking  -     DULoxetine (Cymbalta) 30 mg DR capsule; Take 1 capsule (30 mg) by mouth once daily for 7 days. Do not crush or chew. Tapering down from 120mg to 90mg over 7 days, then reduce to 60mg thereafter.       Plan/Recommendations:  Medications: Starts taking Trazodone 50mg at bedtime as needed for sleep aid. Can take from 0.5-3 tablets to adjust for best sleep goal of 7-8 hours/night and minimal to no grogginess in the AM. Continues taking Vyvanse 60mg every day, Klonopin 0.5mg as needed for panic attacks only. Reduces Cymbalta to 60mg from 90mg daily but increases Lamictal from 200mg to 250mg every day.  Orders: Pleasant yet reserved. Transfer patient from no  Dr. Fernandes. Feels depression is a constant still in his life. Reviewed and agreed to increasing Lamictal for mood/depression stabilization, reduce Cymbalta as he feels no change in dose increase since he last saw Dr. Fernandes. No other changes to Vyvanse or Klonopin - discussed that is to be a band aid only medication. Ordered urine tests for drug screening, Benzodiazapine and Amphetamines. Will come in person at next appt to sign CSA.  Follow up: 2025  Call  Psychiatry at [phone] with issues.  For [facility] residents, [employer] is a / hotline you can call for assistance  [[phone]]. Please call [phone] or go to your closest Emergency Room if you feel unsafe. This includes thoughts of hurting yourself or anyone else, or having other troubles such as hearing voices, seeing visions, or having new and scary thoughts about the people around you.    Review with patient: Treatment plan reviewed with the patient.  Medication risks/benefit reviewed with the patient  Time Spent:    Prep time: 1 min.  Direct patient time: 67 min.  Documentation time: 10 min.  Total time: 78 min.    Quinn Dunbar, APRN-CNP

## 2025-01-28 ENCOUNTER — TELEPHONE (OUTPATIENT)
Dept: BEHAVIORAL HEALTH | Facility: CLINIC | Age: 35
End: 2025-01-28
Payer: MEDICARE

## 2025-01-28 NOTE — PROGRESS NOTES
PROVIDER:Bettina  MEDICATION/DOSAGE:lisdexamfetamine (Vyvanse) 60 mg capsule   QTY/SUPPLY:  PRIOR AUTH COMPLETED VIA:epic  INSURANCE:   Selected coverage:[employer] ([employer])Total coverages:1 Demographics on file   [employer] ([employer])  Covered: RetailNot covered: Mail OrderUnknown: Specialty, Long-Term Care                  Member ID: [id number] BIN: [id number]  : 1990   Group ID:  PCN: [id number]  Legal sex: M   Group name:   Address: [address] [place] [address] [address] [place] [id number]             REF #/KEY:  STATUS pending  Approved  Prior Authorization Portal   Prior authorization approved  Payer: [employer] - Medicare Case ID: [id number]    [phone]  Note from payer: PA Case: [id number], Status: Approved, Coverage Starts on: 2025 12:00:00 AM, Coverage Ends on: 2025 12:00:00 AM. Questions? Contact [phone].  Approval Details    Authorized from 2025 to 2025  Electronic appeal: Not supported  View History     Notes     Time User Attachment    Attachment received from payer. 2025 11:40 AM [facility] Incoming Prescription Prior Auth Response, Covermymeds Electronic Prior Authorization Attachment - Document      Medication Being Authorized    lisdexamfetamine (Vyvanse) 60 mg capsule  Take 1 capsule (60 mg) by mouth once daily in the morning. Do not fill before 2025.  Dispense: 30 capsule Refills: 0   Start: 2025 End: 2025   Class: Normal Diagnoses: Attention deficit hyperactivity disorder (ADHD), combined type   This order has been released to its destination.  To be filled at: [facility] - [place], [place] - [address]   Patient and pharmacy notified.

## 2025-02-02 LAB
AMPHET UR-MCNC: >4000 NG/ML
AMPHET UR-MCNC: ABNORMAL NG/ML
AMPHETAMINES UR QL: POSITIVE NG/ML
BARBITURATES UR QL: NEGATIVE NG/ML
BENZODIAZ UR QL: NEGATIVE NG/ML
BZE UR QL: NEGATIVE NG/ML
CREAT UR-MCNC: 149 MG/DL
DRUG SCREEN COMMENT UR-IMP: ABNORMAL
MDA UR-MCNC: NEGATIVE NG/ML
MDEA UR-MCNC: NEGATIVE NG/ML
MDMA UR-MCNC: NEGATIVE NG/ML
METHADONE UR QL: NEGATIVE NG/ML
METHAMPHET UR-MCNC: NEGATIVE NG/ML
METHAMPHET UR-MCNC: NEGATIVE NG/ML
OPIATES UR QL: NEGATIVE NG/ML
OXIDANTS UR QL: NEGATIVE MCG/ML
OXYCODONE UR QL: NEGATIVE NG/ML
PCP UR QL: NEGATIVE NG/ML
PH UR: 5.2 [PH] (ref 4.5–9)
PHENTERMINE UR-MCNC: NEGATIVE NG/ML
QUEST NOTES AND COMMENTS: ABNORMAL
THC UR QL: NEGATIVE NG/ML

## 2025-03-25 ENCOUNTER — APPOINTMENT (OUTPATIENT)
Dept: BEHAVIORAL HEALTH | Facility: CLINIC | Age: 35
End: 2025-03-25
Payer: MEDICARE

## 2025-03-25 VITALS
SYSTOLIC BLOOD PRESSURE: 125 MMHG | HEART RATE: 108 BPM | HEIGHT: 73 IN | DIASTOLIC BLOOD PRESSURE: 88 MMHG | BODY MASS INDEX: 34.06 KG/M2 | WEIGHT: 257 LBS | TEMPERATURE: 97.8 F

## 2025-03-25 DIAGNOSIS — F43.10 COMPLEX POSTTRAUMATIC STRESS DISORDER: ICD-10-CM

## 2025-03-25 DIAGNOSIS — F42.8 OBSESSIVE THINKING: ICD-10-CM

## 2025-03-25 DIAGNOSIS — F90.2 ATTENTION DEFICIT HYPERACTIVITY DISORDER (ADHD), COMBINED TYPE: Primary | ICD-10-CM

## 2025-03-25 DIAGNOSIS — F41.1 GAD (GENERALIZED ANXIETY DISORDER): ICD-10-CM

## 2025-03-25 DIAGNOSIS — G47.9 SLEEP DIFFICULTIES: ICD-10-CM

## 2025-03-25 DIAGNOSIS — F33.2 SEVERE EPISODE OF RECURRENT MAJOR DEPRESSIVE DISORDER, WITHOUT PSYCHOTIC FEATURES (MULTI): ICD-10-CM

## 2025-03-25 PROCEDURE — 1036F TOBACCO NON-USER: CPT | Performed by: NURSE PRACTITIONER

## 2025-03-25 PROCEDURE — 3008F BODY MASS INDEX DOCD: CPT | Performed by: NURSE PRACTITIONER

## 2025-03-25 PROCEDURE — 99215 OFFICE O/P EST HI 40 MIN: CPT | Performed by: NURSE PRACTITIONER

## 2025-03-25 RX ORDER — LISDEXAMFETAMINE DIMESYLATE 60 MG/1
60 CAPSULE ORAL EVERY MORNING
Qty: 30 CAPSULE | Refills: 0 | Status: SHIPPED | OUTPATIENT
Start: 2025-05-12 | End: 2025-06-11

## 2025-03-25 RX ORDER — MIRTAZAPINE 30 MG/1
30 TABLET, FILM COATED ORAL NIGHTLY
Qty: 30 TABLET | Refills: 0 | Status: SHIPPED | OUTPATIENT
Start: 2025-03-25 | End: 2025-04-24

## 2025-03-25 RX ORDER — LISDEXAMFETAMINE DIMESYLATE 60 MG/1
60 CAPSULE ORAL EVERY MORNING
Qty: 30 CAPSULE | Refills: 0 | Status: SHIPPED | OUTPATIENT
Start: 2025-04-12 | End: 2025-05-12

## 2025-03-25 RX ORDER — MIRTAZAPINE 15 MG/1
15 TABLET, FILM COATED ORAL NIGHTLY
Qty: 30 TABLET | Refills: 0 | Status: SHIPPED | OUTPATIENT
Start: 2025-03-25 | End: 2025-04-24

## 2025-03-25 RX ORDER — LISDEXAMFETAMINE DIMESYLATE 60 MG/1
60 CAPSULE ORAL EVERY MORNING
Qty: 30 CAPSULE | Refills: 0 | Status: SHIPPED | OUTPATIENT
Start: 2025-06-11 | End: 2025-07-11

## 2025-03-25 RX ORDER — MIRTAZAPINE 7.5 MG/1
7.5 TABLET, FILM COATED ORAL NIGHTLY
Qty: 30 TABLET | Refills: 0 | Status: SHIPPED | OUTPATIENT
Start: 2025-03-25 | End: 2025-04-24

## 2025-03-25 RX ORDER — DULOXETIN HYDROCHLORIDE 30 MG/1
30 CAPSULE, DELAYED RELEASE ORAL DAILY
Qty: 30 CAPSULE | Refills: 11 | Status: SHIPPED | OUTPATIENT
Start: 2025-03-25 | End: 2026-03-25

## 2025-03-25 ASSESSMENT — ANXIETY QUESTIONNAIRES
GAD7 TOTAL SCORE: 14
6. BECOMING EASILY ANNOYED OR IRRITABLE: NEARLY EVERY DAY
4. TROUBLE RELAXING: NEARLY EVERY DAY
1. FEELING NERVOUS, ANXIOUS, OR ON EDGE: NEARLY EVERY DAY
2. NOT BEING ABLE TO STOP OR CONTROL WORRYING: MORE THAN HALF THE DAYS
5. BEING SO RESTLESS THAT IT IS HARD TO SIT STILL: NOT AT ALL
3. WORRYING TOO MUCH ABOUT DIFFERENT THINGS: NOT AT ALL
IF YOU CHECKED OFF ANY PROBLEMS ON THIS QUESTIONNAIRE, HOW DIFFICULT HAVE THESE PROBLEMS MADE IT FOR YOU TO DO YOUR WORK, TAKE CARE OF THINGS AT HOME, OR GET ALONG WITH OTHER PEOPLE: VERY DIFFICULT
7. FEELING AFRAID AS IF SOMETHING AWFUL MIGHT HAPPEN: NEARLY EVERY DAY

## 2025-03-25 ASSESSMENT — PATIENT HEALTH QUESTIONNAIRE - PHQ9
6. FEELING BAD ABOUT YOURSELF - OR THAT YOU ARE A FAILURE OR HAVE LET YOURSELF OR YOUR FAMILY DOWN: NEARLY EVERY DAY
5. POOR APPETITE OR OVEREATING: NEARLY EVERY DAY
1. LITTLE INTEREST OR PLEASURE IN DOING THINGS: NOT AT ALL
8. MOVING OR SPEAKING SO SLOWLY THAT OTHER PEOPLE COULD HAVE NOTICED. OR THE OPPOSITE, BEING SO FIGETY OR RESTLESS THAT YOU HAVE BEEN MOVING AROUND A LOT MORE THAN USUAL: NOT AT ALL
2. FEELING DOWN, DEPRESSED OR HOPELESS: NEARLY EVERY DAY
7. TROUBLE CONCENTRATING ON THINGS, SUCH AS READING THE NEWSPAPER OR WATCHING TELEVISION: NOT AT ALL
4. FEELING TIRED OR HAVING LITTLE ENERGY: NEARLY EVERY DAY
10. IF YOU CHECKED OFF ANY PROBLEMS, HOW DIFFICULT HAVE THESE PROBLEMS MADE IT FOR YOU TO DO YOUR WORK, TAKE CARE OF THINGS AT HOME, OR GET ALONG WITH OTHER PEOPLE: VERY DIFFICULT
9. THOUGHTS THAT YOU WOULD BE BETTER OFF DEAD, OR OF HURTING YOURSELF: NEARLY EVERY DAY
3. TROUBLE FALLING OR STAYING ASLEEP OR SLEEPING TOO MUCH: NEARLY EVERY DAY

## 2025-03-25 NOTE — PROGRESS NOTES
"Outpatient Psychiatry    Subjective   Valentín Wang, a 34 y.o. male, presenting to Psychiatry for evaluation.  Patient is referred by Kenroy Kenyon MD \"I've been dealing with a lot of depression and anxiety. One night I wasn't paying attention, and I double dosed my Lamictal. I couldn't sleep and my OCD was out of control.\" (Mother is sitting in the appt with patient's verbal permission.)    HPI:    Present Illness - depression, anxiety     Characteristics/Recent psychiatric symptoms (pertinent positives and negatives) - reports Trazodone was too strong at no matter the dose so stopped taking it. Describes his depression as being 'hopeless thoughts, dark, that I am a bad son.' Admits he feels his anxiety is triggering his depression more now than before. Reports issues with falling asleep - staying on his phone too late into the night. Admits to ruminating and racing thoughts - ranging from the need to falling asleep, to dwelling about life in general, to thoughts about he should be pushing off to when he is awake and not when he should be relaxing and falling asleep. Will eventually fall asleep - estimates 4 hours or more, and gets 6-7 hours of sleep total, will take the Vyvanse, but then lays down again but doesn't go back to sleep, but is tired. Energy levels are low even with the Vyvanse, and then after an hour will feel the energy levels will start to go up. Takes Vyvanse around 10am but is uncertain when it wears off as he feels energy lasts him through the day and into the evening if not night. Does notice he is mentally/physically fatigued. Does deal with fibromyalgia about 4 years ago and that can factor into the fatigue he does experience. Reports his father who caused a lot of problems for the family - left the mother abruptly, and the patient and sister and they do rely on the father for money to survive, but the father has a superiority complex which does complicate their lives, and the patient does " not like his father, especially with how he treats his mother, as he is condescending to them all. After the last appt, by several days, when the patient accidentally doubled the dose of Lamcital, he ended up being up all night, and his mother, as she reports was frightened for his well-being, stayed up all night watching him, as he became irrational, telling her to say things a different way, reflecting a huge return of his OCD symptoms (hyper speech) where it was like how he was when a child - demanding her to repeat 'I love you' several time before he was happy. Anxiety is currently overwhelming to him, smothering and 'he has to cry to release the tension of the anxiety. He will be so overwhelmed by it, that I have to close the door and calm him down, and I have given half of my Klonopin to him to help him out. I even have his text messages where he is feeling so overwhelmed that he wanted to end his life.' - per mother.     Onset/timeframe - depression (started at 7 yo, got worse at 19 yo and has always been there), anxiety (started at 5 yo)  Type - depression, anxiety  Duration - daily  Aggravating and/or relieving factors/triggers  Treatment and treatment changes (new meds, dosage increases or decreases, med compliance, therapy frequency, etc.) (Past and Recent) - see below.    Issues: Denies SI/HI/AVH currently.    Psychiatric Review Of Systems:  Depressive Symptoms: appetite increased, energy, guilt, hopeless, sleep decreased , sleep increased, and passive SI  Manic Symptoms: negative  Anxiety Symptoms: General Anxiety Disorder (HILTON)HILTON Behaviors: difficult to control worry, excessive anxiety/worry, irritability, sleep disturbance, and dread, Obsessive Compulsive Disorder (OCD)OCD Behaviors: repetitive thoughts and ruminatory thoughts, and Post Traumatic Stress Disorder (PTSD)PTSD: traumatic event, avoidance of stimuli associated with event, hypervigilance, irritability, and outbursts of anger  Psychotic  Symptoms: negative  Other Symptoms:    Current Medications:    Current Outpatient Medications:     acetaminophen (TylenoL) 325 mg capsule, Take 1 capsule (325 mg) by mouth every 6 hours if needed for mild pain (1 - 3) or headaches., Disp: , Rfl:     cholecalciferol (Vitamin D3) 50 mcg (2,000 unit) capsule, Take 1 capsule (50 mcg) by mouth early in the morning.., Disp: , Rfl:     clonazePAM (KlonoPIN) 0.5 mg tablet, Take 1 tablet (0.5 mg) by mouth once daily as needed for anxiety., Disp: 15 tablet, Rfl: 1    DULoxetine (Cymbalta) 60 mg DR capsule, 2 capsules daily, Disp: 60 capsule, Rfl: 0    ibuprofen (Motrin) capsule, Take by mouth., Disp: , Rfl:     lamoTRIgine (LaMICtal) 200 mg tablet, Take 1 tablet (200 mg) by mouth once daily., Disp: 90 tablet, Rfl: 0    lisdexamfetamine (Vyvanse) 60 mg capsule, Take 1 capsule (60 mg) by mouth once daily in the morning. Do not fill before March 13, 2025., Disp: 30 capsule, Rfl: 0    multivitamin tablet, Take 1 tablet by mouth once daily., Disp: , Rfl:     omega-3 acid ethyl esters (Lovaza) 1 gram capsule, Take 1 capsule (1 g) by mouth once daily., Disp: , Rfl:     Ubrelvy 100 mg tablet tablet, Take (1) tab at onset of headache, may repeat once in 2hrs if needed. No more than 2 doses in 24 hours., Disp: , Rfl:     lamoTRIgine (LaMICtal) 25 mg tablet, Take 2 tablets (50 mg) by mouth once daily. To be taken with current dose of 200mg for a total of 250mg at bedtime. (Patient not taking: Reported on 3/25/2025), Disp: 180 tablet, Rfl: 3    lisdexamfetamine (Vyvanse) 60 mg capsule, Take 1 capsule (60 mg) by mouth once daily in the morning. Do not fill before January 12, 2025., Disp: 30 capsule, Rfl: 0    lisdexamfetamine (Vyvanse) 60 mg capsule, Take 1 capsule (60 mg) by mouth once daily in the morning. Do not fill before February 11, 2025., Disp: 30 capsule, Rfl: 0    MEN'S MULTI-VITAMIN ORAL, , Disp: , Rfl:     Nurtec ODT 75 mg tablet,disintegrating, TAKE 1 TAB BY MOUTH ONCE DAILY  AS NEEDED FOR ACUTE MIGRAINE ONSET DO NOT REPEAT FOR 24 HOURS, Disp: , Rfl:     traZODone (Desyrel) 50 mg tablet, Take 3 tablets (150 mg) by mouth once daily at bedtime. Can try taking 1 tablet up to 3 tablets at bedtime, in 1/2 tab increments for sleep aid. (Patient not taking: Reported on 3/25/2025), Disp: 270 tablet, Rfl: 3    Medical History:  Past Medical History:   Diagnosis Date    Generalized anxiety disorder 05/04/2021    Generalized anxiety disorder    Personal history of other mental and behavioral disorders 02/18/2020    History of panic attacks    Personal history of other mental and behavioral disorders 02/18/2020    History of obsessive compulsive disorder    Personal history of other specified conditions     History of insomnia       Substance Use History:  Tobacco use: denies  Use of alcohol: denied  Use of caffeine: coffee 1-2 /day  Use of other substances: denies denies  Legal consequences of substance use: n/a  Substance use disorder treatment: n/a    Record Review: brief     Medical Review Of Systems:  Behavioral/Psych: positive for anxiety, behavior problems, depression, irritability, and separation anxiety    OARRS:  RACHANA Bowling-CNP on 3/25/2025  4:58 PM  I have personally reviewed the OARRS report for Valentín Wang. I have considered the risks of abuse, dependence, addiction and diversion    Is the patient prescribed a combination of a benzodiazepine and opioid?  No    Last Urine Drug Screen / ordered today: Yes  Recent Results (from the past 8760 hours)   Drug Screen, Urine With Reflex to Confirmation    Collection Time: 01/29/25  1:28 PM   Result Value Ref Range    Amphetamines POSITIVE (A) <500 ng/mL    Amphetamine >05159 (H) <250 ng/mL    Methamphetamine NEGATIVE <250 ng/mL    Amphetamines Comments      Barbiturates NEGATIVE <300 ng/mL    Benzodiazepines NEGATIVE <100 ng/mL    Cocaine Metabolite NEGATIVE <150 ng/mL    Marijuana Metabolite NEGATIVE <20 ng/mL    Methadone  Metabolite NEGATIVE <100 ng/mL    Opiates NEGATIVE <100 ng/mL    Oxycodone NEGATIVE <100 ng/mL    Phencyclidine NEGATIVE <25 ng/mL    Creatinine 149.0 > or = 20.0 mg/dL    pH 5.2 4.5 - 9.0    Oxidant NEGATIVE <200 mcg/mL    Notes and Comments     Amphetamine Confirm, Urine    Collection Time: 01/29/25  1:28 PM   Result Value Ref Range    AMPHETAMINE >4000 (H) <200 ng/mL    METHAMPHETAMINE NEGATIVE <200 ng/mL    PHENTERMINE NEGATIVE <200 ng/mL    MDA NEGATIVE <200 ng/mL    MDMA NEGATIVE <200 ng/mL    MDEA NEGATIVE <200 ng/mL     Results are as expected.         Controlled Substance Agreement:  Date of the Last Agreement: 01/09/2025, signed 03/25/2025  Reviewed Controlled Substance Agreement including but not limited to the benefits, risks, and alternatives to treatment with a Controlled Substance medication(s).    Benzodiazepines:  What is the patient's goal of therapy? Stable anxiety/panic attacks  Is this being achieved with current treatment? partially    Activities of Daily Living:   Is your overall impression that this patient is benefiting (symptom reduction outweighs side effects) from benzodiazepine therapy? sometimes    1. Physical Functioning: Worse  2. Family Relationship: Worse  3. Social Relationship: Worse  4. Mood: Worse  5. Sleep Patterns: Worse  6. Overall Function: Worse and Stimulants:   What is the patient's goal of therapy? Stable focus  Is this being achieved with current treatment? yes    Activities of Daily Living:   Is your overall impression that this patient is benefiting (symptom reduction outweighs side effects) from stimulant therapy? Yes     1. Physical Functioning: Better  2. Family Relationship: Worse  3. Social Relationship: Worse  4. Mood: Worse  5. Sleep Patterns: Worse  6. Overall Function: Worse      Objective   Mental Status Exam  Appearance: Well-groomed, scruffy faced  Attitude: Cooperative, guarded, distracted at times but engaged in conversation.  Behavior: Fair eye contact.    Motor Activity: Fidgeting. No evidence of extrapyramidal symptoms or tardive dyskinesia.  Speech: Regular rate, rhythm, volume. Spontaneous, no pressured speech.  Mood: 'not good, depressed, anxious'  Affect: Dysthymic, constricted, flat, anxious and mood congruent.  Thought Process: Flight of ideas, obsessive, thought blocking. No loose associations or gross thought disorganization.  Thought Content: Denied current suicidal ideation or thoughts of harm to self, denied homicidal ideation or thoughts of harm to others. No delusional thinking elicited. No perseverations or obsessions identified. Dealing with father and his control over the family and how triggering he is. Wanting to move out and start a life of his own but can't get himself to do it and doesn't know how to.  Perception: Did not endorse auditory or visual hallucinations, did not appear to be responding to hallucinatory stimuli.   Cognition: Alert, oriented x3. Preserved attention span and concentration, recent and remote memory. Adequate fund of knowledge. No deficits in language.   Insight: Poor, in regards to understanding mental health condition  Judgement: Poor    HILTON-7/PHQ-9 scores reviewed: 14, 18 compared to 5, 18 reflecting a jump in anxiety and no change with moderate-severe depression.     Vitals:  Vitals:    03/25/25 1308   BP: 125/88   Pulse: 108   Temp: 36.6 °C (97.8 °F)       Other Objective Information:  Orders Only on 01/29/2025   Component Date Value Ref Range Status    AMPHETAMINE 01/29/2025 >4000 (H)  <200 ng/mL Final    METHAMPHETAMINE 01/29/2025 NEGATIVE  <200 ng/mL Final    PHENTERMINE 01/29/2025 NEGATIVE  <200 ng/mL Final    MDA 01/29/2025 NEGATIVE  <200 ng/mL Final    MDMA 01/29/2025 NEGATIVE  <200 ng/mL Final    MDEA 01/29/2025 NEGATIVE  <200 ng/mL Final    Amphetamines 01/29/2025 POSITIVE (A)  <500 ng/mL Final    Amphetamine 01/29/2025 >03426 (H)  <250 ng/mL Final    Methamphetamine 01/29/2025 NEGATIVE  <250 ng/mL Final     Amphetamines Comments 01/29/2025    Final    Barbiturates 01/29/2025 NEGATIVE  <300 ng/mL Final    Benzodiazepines 01/29/2025 NEGATIVE  <100 ng/mL Final    Cocaine Metabolite 01/29/2025 NEGATIVE  <150 ng/mL Final    Marijuana Metabolite 01/29/2025 NEGATIVE  <20 ng/mL Final    Methadone Metabolite 01/29/2025 NEGATIVE  <100 ng/mL Final    Opiates 01/29/2025 NEGATIVE  <100 ng/mL Final    Oxycodone 01/29/2025 NEGATIVE  <100 ng/mL Final    Phencyclidine 01/29/2025 NEGATIVE  <25 ng/mL Final    Creatinine 01/29/2025 149.0  > or = 20.0 mg/dL Final    pH 01/29/2025 5.2  4.5 - 9.0 Final    Oxidant 01/29/2025 NEGATIVE  <200 mcg/mL Final    Notes and Comments 01/29/2025    Final       Results reflect patient is taking his Vyvanse as prescribed daily, however he is only taking Klonopin PRN.      Risk Assessment:  Risk of harm to self: Low Risk -- Risk factors include: Depression, History of trauma or abuse , Hopelessness , Severe anxiety, and passive SI Protective factors include:Future-oriented talk , Willingness to seek help and support , Receiving and engaged in care for mental, physical, and substance use disorders , History of adhering to treatment recommendations and/or prescribed medication regimen , Support through ongoing medical and mental healthcare relationships , Interpersonal relationships and supports, e.g., family, friends, peers, community , and open to referral for therapy.    Risk of harm to others: Low Risk - Risk factors include: No significant risk factors identified on screening. Protective factors include: Lack of known history of harm to others  and Lack of known history of violent ideation     PSYCHOTHERAPY:  Plan: goals, type of therapy/modality, mental status when leaving, dx, time, given referral for therapy within [facility]     Diagnoses and all orders for this visit:  Complex posttraumatic stress disorder (Primary)       Plan/Recommendations:  Medications: Already stopped Trazodone due to being too  strong. Continues taking Vyvanse 60mg every day but at 7am, not 10am, Klonopin 0.5mg as needed for panic attacks only. Increases Cymbalta to 60mg to  90mg daily. Lamictal 200mg every day. Starts taking 7.5mg at bedtime for 2 weeks, then increases to 15mg at bedtime for another 2 weeks, then increases to 30mg at bedtime. At lower doses, sleep aid and sedation impact is felt stronger, but as you go up on the doses, the less daytime grogginess you will experience, while sleep remains stable.  Orders: Cooperative but flat and muted at times. Mother accompanied patient to give her feedback. Struggling with multiple issues, stemming from issues with controlling father/trauma history. Gave referral for therapy (focusing on trauma) with the [facility]. Agreed to increase Cymbalta to 90mg from 60mg (down from the original 120mg). Adding on Remeron for sleep but also as adjunct to Cymbalta for depression. Takes Vyvanse earlier in the AM as it could be playing a role in what is keeping patient up too late at night - having patient take it at 7am instead of 10am every day. Patient signed CSA. Labs reviewed.  Follow up: 05/20  Call  Psychiatry at [phone] with issues.  For [facility] residents, [employer] is a 24/7 hotline you can call for assistance [[phone]]. Please call [phone] or go to your closest Emergency Room if you feel unsafe. This includes thoughts of hurting yourself or anyone else, or having other troubles such as hearing voices, seeing visions, or having new and scary thoughts about the people around you.    Review with patient: Treatment plan reviewed with the patient.  Medication risks/benefit reviewed with the patient  Time Spent:    Prep time: 1 min.  Direct patient time: 33 min.  Documentation time: 6 min.  Total time: 40 min.    Quinn Dunbar, APRN-CNP

## 2025-04-07 ENCOUNTER — TELEPHONE (OUTPATIENT)
Dept: BEHAVIORAL HEALTH | Facility: CLINIC | Age: 35
End: 2025-04-07
Payer: MEDICARE

## 2025-04-07 DIAGNOSIS — F34.1 PERSISTENT DEPRESSIVE DISORDER: ICD-10-CM

## 2025-04-07 DIAGNOSIS — F34.1 PERSISTENT DEPRESSIVE DISORDER: Primary | ICD-10-CM

## 2025-04-07 RX ORDER — DULOXETIN HYDROCHLORIDE 60 MG/1
CAPSULE, DELAYED RELEASE ORAL
Qty: 60 CAPSULE | Refills: 0 | Status: CANCELLED | OUTPATIENT
Start: 2025-04-07

## 2025-04-07 RX ORDER — DULOXETIN HYDROCHLORIDE 60 MG/1
CAPSULE, DELAYED RELEASE ORAL
Qty: 30 CAPSULE | Refills: 11 | Status: SHIPPED | OUTPATIENT
Start: 2025-04-07

## 2025-04-07 NOTE — PROGRESS NOTES
Rewrote Cymbalta order as taking 1, 60mg capsule, not 2 capsules as discussed was to reduce dose to 90mg from 120mg. He is now taking 60mg and 30mg together.

## 2025-05-20 ENCOUNTER — APPOINTMENT (OUTPATIENT)
Dept: BEHAVIORAL HEALTH | Facility: CLINIC | Age: 35
End: 2025-05-20
Payer: MEDICARE

## 2025-05-20 DIAGNOSIS — F90.2 ATTENTION DEFICIT HYPERACTIVITY DISORDER (ADHD), COMBINED TYPE: ICD-10-CM

## 2025-05-20 DIAGNOSIS — F41.1 GAD (GENERALIZED ANXIETY DISORDER): ICD-10-CM

## 2025-05-20 DIAGNOSIS — F33.2 SEVERE EPISODE OF RECURRENT MAJOR DEPRESSIVE DISORDER, WITHOUT PSYCHOTIC FEATURES (MULTI): Primary | ICD-10-CM

## 2025-05-20 DIAGNOSIS — F42.8 OBSESSIVE THINKING: ICD-10-CM

## 2025-05-20 DIAGNOSIS — G47.9 SLEEP DIFFICULTIES: ICD-10-CM

## 2025-05-20 DIAGNOSIS — F43.10 COMPLEX POSTTRAUMATIC STRESS DISORDER: ICD-10-CM

## 2025-05-20 DIAGNOSIS — F31.76 BIPOLAR DISORDER, IN FULL REMISSION, MOST RECENT EPISODE DEPRESSED (MULTI): ICD-10-CM

## 2025-05-20 PROCEDURE — 99214 OFFICE O/P EST MOD 30 MIN: CPT | Performed by: NURSE PRACTITIONER

## 2025-05-20 PROCEDURE — 1036F TOBACCO NON-USER: CPT | Performed by: NURSE PRACTITIONER

## 2025-05-20 PROCEDURE — G2211 COMPLEX E/M VISIT ADD ON: HCPCS | Performed by: NURSE PRACTITIONER

## 2025-05-20 RX ORDER — LAMOTRIGINE 200 MG/1
200 TABLET ORAL DAILY
Qty: 90 TABLET | Refills: 3 | Status: SHIPPED | OUTPATIENT
Start: 2025-05-20 | End: 2026-05-20

## 2025-05-20 ASSESSMENT — ANXIETY QUESTIONNAIRES
7. FEELING AFRAID AS IF SOMETHING AWFUL MIGHT HAPPEN: NOT AT ALL
IF YOU CHECKED OFF ANY PROBLEMS ON THIS QUESTIONNAIRE, HOW DIFFICULT HAVE THESE PROBLEMS MADE IT FOR YOU TO DO YOUR WORK, TAKE CARE OF THINGS AT HOME, OR GET ALONG WITH OTHER PEOPLE: EXTREMELY DIFFICULT
5. BEING SO RESTLESS THAT IT IS HARD TO SIT STILL: NOT AT ALL
GAD7 TOTAL SCORE: 14
2. NOT BEING ABLE TO STOP OR CONTROL WORRYING: NEARLY EVERY DAY
6. BECOMING EASILY ANNOYED OR IRRITABLE: NEARLY EVERY DAY
3. WORRYING TOO MUCH ABOUT DIFFERENT THINGS: MORE THAN HALF THE DAYS
4. TROUBLE RELAXING: NEARLY EVERY DAY
1. FEELING NERVOUS, ANXIOUS, OR ON EDGE: NEARLY EVERY DAY

## 2025-05-20 ASSESSMENT — PATIENT HEALTH QUESTIONNAIRE - PHQ9
10. IF YOU CHECKED OFF ANY PROBLEMS, HOW DIFFICULT HAVE THESE PROBLEMS MADE IT FOR YOU TO DO YOUR WORK, TAKE CARE OF THINGS AT HOME, OR GET ALONG WITH OTHER PEOPLE: EXTREMELY DIFFICULT
3. TROUBLE FALLING OR STAYING ASLEEP OR SLEEPING TOO MUCH: NEARLY EVERY DAY
4. FEELING TIRED OR HAVING LITTLE ENERGY: NEARLY EVERY DAY
8. MOVING OR SPEAKING SO SLOWLY THAT OTHER PEOPLE COULD HAVE NOTICED. OR THE OPPOSITE, BEING SO FIGETY OR RESTLESS THAT YOU HAVE BEEN MOVING AROUND A LOT MORE THAN USUAL: NOT AT ALL
6. FEELING BAD ABOUT YOURSELF - OR THAT YOU ARE A FAILURE OR HAVE LET YOURSELF OR YOUR FAMILY DOWN: NEARLY EVERY DAY
9. THOUGHTS THAT YOU WOULD BE BETTER OFF DEAD, OR OF HURTING YOURSELF: NEARLY EVERY DAY
7. TROUBLE CONCENTRATING ON THINGS, SUCH AS READING THE NEWSPAPER OR WATCHING TELEVISION: SEVERAL DAYS
1. LITTLE INTEREST OR PLEASURE IN DOING THINGS: MORE THAN HALF THE DAYS
2. FEELING DOWN, DEPRESSED OR HOPELESS: NEARLY EVERY DAY
5. POOR APPETITE OR OVEREATING: NEARLY EVERY DAY

## 2025-05-20 NOTE — PROGRESS NOTES
"Outpatient Psychiatry    Subjective   Valentín Wang, a 34 y.o. male, presenting to Psychiatry for evaluation.  Patient is referred by Kenroy Kenyon MD \"My depression has been really out of control. Just little thing upsets me. More so related to the trauma of my past, with littlest things setting me off. I got the referral for a psychologist and they sent me onto someone else we haven't heard back from them yet. I did try the Remeron but I couldn't try it more than a few days as it just took the sage out of things. It just left me so tired.\" (Mother is sitting in with his verbal permission.)    HPI:    Present Illness - depression, anxiety     Characteristics/Recent psychiatric symptoms (pertinent positives and negatives) - reports Mirtazapine left him tired but felt it was leaving him more blunted, and 'took the sage out of doing anything, like zapping the energy out of me.' Continues to describe his depression as being 'hopeless thoughts, dark, that I am a bad son.' Still feels the core issue underlying that is d/t his trauma history, cPTSD that needs to be addressed through therapy, and still is trying to find a therapist. They talked to intake at , and was given a referral out, and the new place had done a full evaluation, and the name of a PsyD ( background with trauma), but that person never called him and waiting several weeks, and both he and mother never heard from them. Reports they attempted to call the agency again, and never heard from them. Reports he will go through several times of doing well and then the littlest issue will spiral him into emotional moments of crying (like finding out a raccoon crawls under the deck of the home). Reports his sister is supportive of him and even his dad, whom he has a weak relationship with, is expressing worry for his mental health and is willing to give extra money to help out. Describes his anxiety as 'incapacitating worry that makes me freeze up.' " Reports sleep is improved when he does exercise and walk more during the day, as he will fall asleep more quickly, but still has issues with racing, obsessive thoughts holding him back from being able to fall asleep. Sleep still averages around 5 hours. Denies nightmares or dreams. Wakes up very exhausted and fatigued. Did shift taking the Vyvanse, to earlier in the AM (around 7am), and did not notice any change on his energy levels Reports Vyvanse will help boost him awake and keeps his energy levels up, even after he lays back in his bed and relaxes, and doesn't notice so much mental/physical fatigue. Denies any further OCD symptoms. Appetite is stress/emotional eating and nothing else.     Onset/timeframe - depression (started at 7 yo, got worse at 21 yo and has always been there), anxiety (started at 5 yo)  Type - depression, anxiety  Duration - daily  Aggravating and/or relieving factors/triggers  Treatment and treatment changes (new meds, dosage increases or decreases, med compliance, therapy frequency, etc.) (Past and Recent) - see below.    Issues: Denies SI/HI/AVH currently.        Psychiatric Review Of Systems:  Depressive Symptoms: anhedonia, appetite increased, concentration, energy, guilt, helpless, hopeless, sleep decreased , sleep increased, withdrawn, and passive SI  Manic Symptoms: negative  Anxiety Symptoms: General Anxiety Disorder (HILTON)HILTON Behaviors: difficult to control worry, excessive anxiety/worry, difficulty concentrating, easily fatigued, irritability, restlessness, sleep disturbance, and dread, Obsessive Compulsive Disorder (OCD)OCD Behaviors: repetitive thoughts and ruminatory thoughts, and Post Traumatic Stress Disorder (PTSD)PTSD: traumatic event, avoidance of stimuli associated with event, persistent symptoms of increased arousal, hypervigilance, irritability, and outbursts of anger  Psychotic Symptoms: negative  Other Symptoms:    Current Medications:    Current Outpatient  Medications:     acetaminophen (TylenoL) 325 mg capsule, Take 1 capsule (325 mg) by mouth every 6 hours if needed for mild pain (1 - 3) or headaches., Disp: , Rfl:     cholecalciferol (Vitamin D3) 50 mcg (2,000 unit) capsule, Take 1 capsule (2,000 Units) by mouth early in the morning.., Disp: , Rfl:     clonazePAM (KlonoPIN) 0.5 mg tablet, Take 1 tablet (0.5 mg) by mouth once daily as needed for anxiety., Disp: 15 tablet, Rfl: 1    DULoxetine (Cymbalta) 30 mg DR capsule, Take 1 capsule (30 mg) by mouth once daily. Do not crush or chew., Disp: 30 capsule, Rfl: 11    DULoxetine (Cymbalta) 60 mg DR capsule, 1 capsule daily, Disp: 30 capsule, Rfl: 11    ibuprofen (Motrin) capsule, Take by mouth., Disp: , Rfl:     lamoTRIgine (LaMICtal) 200 mg tablet, Take 1 tablet (200 mg) by mouth once daily., Disp: 90 tablet, Rfl: 3    lisdexamfetamine (Vyvanse) 60 mg capsule, Take 1 capsule (60 mg) by mouth once daily in the morning. Do not fill before May 12, 2025., Disp: 30 capsule, Rfl: 0    [START ON 6/11/2025] lisdexamfetamine (Vyvanse) 60 mg capsule, Take 1 capsule (60 mg) by mouth once daily in the morning. Do not fill before June 11, 2025., Disp: 30 capsule, Rfl: 0    multivitamin tablet, Take 1 tablet by mouth once daily., Disp: , Rfl:     omega-3 acid ethyl esters (Lovaza) 1 gram capsule, Take 1 capsule (1 g) by mouth once daily., Disp: , Rfl:     Ubrelvy 100 mg tablet tablet, Take (1) tab at onset of headache, may repeat once in 2hrs if needed. No more than 2 doses in 24 hours., Disp: , Rfl:     Medical History:  Past Medical History:   Diagnosis Date    Generalized anxiety disorder 05/04/2021    Generalized anxiety disorder    Personal history of other mental and behavioral disorders 02/18/2020    History of panic attacks    Personal history of other mental and behavioral disorders 02/18/2020    History of obsessive compulsive disorder    Personal history of other specified conditions     History of insomnia        Substance Use History:  Tobacco use: denies  Use of alcohol: denied  Use of caffeine: coffee 1-2 /day  Use of other substances: denies denies  Legal consequences of substance use: n/a  Substance use disorder treatment: n/a    Record Review: brief     Medical Review Of Systems:  Behavioral/Psych: positive for anxiety, behavior problems, depression, irritability, and separation anxiety    OARRS:  Quinn Dunbar, APRN-CNP on 5/20/2025  6:36 PM  I have personally reviewed the OARRS report for Valentín VAMSHI Wang. I have considered the risks of abuse, dependence, addiction and diversion    Is the patient prescribed a combination of a benzodiazepine and opioid?  No    Last Urine Drug Screen / ordered today: Yes  Recent Results (from the past 8760 hours)   Drug Screen, Urine With Reflex to Confirmation    Collection Time: 01/29/25  1:28 PM   Result Value Ref Range    Amphetamines POSITIVE (A) <500 ng/mL    Amphetamine >83401 (H) <250 ng/mL    Methamphetamine NEGATIVE <250 ng/mL    Amphetamines Comments      Barbiturates NEGATIVE <300 ng/mL    Benzodiazepines NEGATIVE <100 ng/mL    Cocaine Metabolite NEGATIVE <150 ng/mL    Marijuana Metabolite NEGATIVE <20 ng/mL    Methadone Metabolite NEGATIVE <100 ng/mL    Opiates NEGATIVE <100 ng/mL    Oxycodone NEGATIVE <100 ng/mL    Phencyclidine NEGATIVE <25 ng/mL    Creatinine 149.0 > or = 20.0 mg/dL    pH 5.2 4.5 - 9.0    Oxidant NEGATIVE <200 mcg/mL    Notes and Comments     Amphetamine Confirm, Urine    Collection Time: 01/29/25  1:28 PM   Result Value Ref Range    AMPHETAMINE >4000 (H) <200 ng/mL    METHAMPHETAMINE NEGATIVE <200 ng/mL    PHENTERMINE NEGATIVE <200 ng/mL    MDA NEGATIVE <200 ng/mL    MDMA NEGATIVE <200 ng/mL    MDEA NEGATIVE <200 ng/mL     Results are as expected.         Controlled Substance Agreement:  Date of the Last Agreement: 01/09/2025, signed 03/25/2025  Reviewed Controlled Substance Agreement including but not limited to the benefits, risks, and  alternatives to treatment with a Controlled Substance medication(s).    Benzodiazepines:  What is the patient's goal of therapy? Stable anxiety/panic attacks  Is this being achieved with current treatment? partially    Activities of Daily Living:   Is your overall impression that this patient is benefiting (symptom reduction outweighs side effects) from benzodiazepine therapy? sometimes    1. Physical Functioning: Worse  2. Family Relationship: Worse  3. Social Relationship: Worse  4. Mood: Worse  5. Sleep Patterns: Worse  6. Overall Function: Worse and Stimulants:   What is the patient's goal of therapy? Stable focus  Is this being achieved with current treatment? yes    Activities of Daily Living:   Is your overall impression that this patient is benefiting (symptom reduction outweighs side effects) from stimulant therapy? Yes     1. Physical Functioning: Worse  2. Family Relationship: Worse  3. Social Relationship: Worse  4. Mood: Worse  5. Sleep Patterns: Worse  6. Overall Function: Worse      Objective   Mental Status Exam  Appearance: Fairly-groomed, scruffy faced  Attitude: Cooperative, guarded, distracted at times but engaged in conversation.  Behavior: Fair eye contact.   Motor Activity: No psychomotor agitation. No evidence of extrapyramidal symptoms or tardive dyskinesia.  Speech: Regular rate, rhythm, volume. Spontaneous, no pressured speech.  Mood: 'not good at all, very depressed, and anxious'  Affect: Dysthymic, constricted, flat, anxious and mood congruent.  Thought Process: Flight of ideas, obsessive, thought blocking. No loose associations or gross thought disorganization.  Thought Content: Denied current suicidal ideation or thoughts of harm to self, denied homicidal ideation or thoughts of harm to others. No delusional thinking elicited. No perseverations or obsessions identified. Didn't want to stay on Remeron longer than a few days, as 2 days in, and he felt flattened out, and feels depression  in general is worsened, and with mother's feedback, and father's willingness to help pay for treatment. Open to treatment with Resistant Treatment Program through [facility]  Perception: Did not endorse auditory or visual hallucinations, did not appear to be responding to hallucinatory stimuli.   Cognition: Alert, oriented x3. Preserved attention span and concentration, recent and remote memory. Adequate fund of knowledge. No deficits in language.   Insight: Poor, in regards to understanding mental health condition  Judgement: Poor    HILTON-7/PHQ-9 scores reviewed: 14, 21 compared to 14, 18 reflecting no change with moderate anxiety but an increase in moderate-severe to severe depression.     Vitals:  There were no vitals filed for this visit.      Other Objective Information:  No visits with results within 2 Month(s) from this visit.   Latest known visit with results is:   Orders Only on 01/29/2025   Component Date Value Ref Range Status    AMPHETAMINE 01/29/2025 >4000 (H)  <200 ng/mL Final    METHAMPHETAMINE 01/29/2025 NEGATIVE  <200 ng/mL Final    PHENTERMINE 01/29/2025 NEGATIVE  <200 ng/mL Final    MDA 01/29/2025 NEGATIVE  <200 ng/mL Final    MDMA 01/29/2025 NEGATIVE  <200 ng/mL Final    MDEA 01/29/2025 NEGATIVE  <200 ng/mL Final    Amphetamines 01/29/2025 POSITIVE (A)  <500 ng/mL Final    Amphetamine 01/29/2025 >92957 (H)  <250 ng/mL Final    Methamphetamine 01/29/2025 NEGATIVE  <250 ng/mL Final    Amphetamines Comments 01/29/2025    Final    Barbiturates 01/29/2025 NEGATIVE  <300 ng/mL Final    Benzodiazepines 01/29/2025 NEGATIVE  <100 ng/mL Final    Cocaine Metabolite 01/29/2025 NEGATIVE  <150 ng/mL Final    Marijuana Metabolite 01/29/2025 NEGATIVE  <20 ng/mL Final    Methadone Metabolite 01/29/2025 NEGATIVE  <100 ng/mL Final    Opiates 01/29/2025 NEGATIVE  <100 ng/mL Final    Oxycodone 01/29/2025 NEGATIVE  <100 ng/mL Final    Phencyclidine 01/29/2025 NEGATIVE  <25 ng/mL Final    Creatinine 01/29/2025 149.0  > or =  20.0 mg/dL Final    pH 01/29/2025 5.2  4.5 - 9.0 Final    Oxidant 01/29/2025 NEGATIVE  <200 mcg/mL Final    Notes and Comments 01/29/2025    Final       Results reflect patient is taking his Vyvanse as prescribed daily, however he is only taking Klonopin PRN.      Risk Assessment:  Risk of harm to self: Low Risk -- Risk factors include: Depression, History of trauma or abuse , Hopelessness , Severe anxiety, and passive SI Protective factors include:Future-oriented talk , Willingness to seek help and support , Receiving and engaged in care for mental, physical, and substance use disorders , History of adhering to treatment recommendations and/or prescribed medication regimen , Support through ongoing medical and mental healthcare relationships , Interpersonal relationships and supports, e.g., family, friends, peers, community , and open to referral for therapy.    Risk of harm to others: Low Risk - Risk factors include: No significant risk factors identified on screening. Protective factors include: Lack of known history of harm to others  and Lack of known history of violent ideation     PSYCHOTHERAPY:  Plan: goals, type of therapy/modality, mental status when leaving, dx, time, given referral for therapy within [facility]     Diagnoses and all orders for this visit:  Bipolar disorder, in full remission, most recent episode depressed (Multi) (Primary)  -     lamoTRIgine (LaMICtal) 200 mg tablet; Take 1 tablet (200 mg) by mouth once daily.  Attention deficit hyperactivity disorder (ADHD), combined type  Complex posttraumatic stress disorder  HILTON (generalized anxiety disorder)  Obsessive thinking  Sleep difficulties  Severe episode of recurrent major depressive disorder, without psychotic features (Multi)         Plan/Recommendations:  Medications: Already stopped Mirtazapine due to reported side effects. Continues taking Vyvanse 60mg every day at 7am. Klonopin 0.5mg as needed for panic attacks only. Cymbalta 60mg  90mg  daily. Lamictal 200mg every day. Discontinuing Mirtazapine.  Orders: Cooperative but flat, muted, depressed and very anxious. Mother accompanied patient to give her feedback. Mirtazapine didn't work as he felt it was too strong, leaving him feeling more depressed (after 2 days), and his overall depression was worsening. Father was willing to help out financially as he was worried for the patient's mental health, as the depression is so severe, that after discussing, it was agreed that he would benefit from getting a referral to the [facility]s [facility], at [facility] for alternative depression treatment. Referral is to go to Dr. Darell Montenegro. Patient is to remain on medications and no change to treatment plan otherwise.  Follow up: 08/21  Call  Psychiatry at [phone] with issues.  For [facility] residents, [facility] is a 24/7 hotline you can call for assistance [[phone]]. Please call 528/765 or go to your closest Emergency Room if you feel unsafe. This includes thoughts of hurting yourself or anyone else, or having other troubles such as hearing voices, seeing visions, or having new and scary thoughts about the people around you.    Review with patient: Treatment plan reviewed with the patient.  Medication risks/benefit reviewed with the patient  Time Spent:    Prep time: 1 min.  Direct patient time: 30 min.  Documentation time: 8 min.  Total time: 39 min.    Quinn Dunbar, APRN-CNP

## 2025-06-11 DIAGNOSIS — F31.76 BIPOLAR DISORDER, IN FULL REMISSION, MOST RECENT EPISODE DEPRESSED (MULTI): ICD-10-CM

## 2025-06-11 RX ORDER — LAMOTRIGINE 200 MG/1
TABLET ORAL DAILY
Qty: 90 TABLET | Refills: 3 | OUTPATIENT
Start: 2025-06-11

## 2025-06-17 ENCOUNTER — TELEPHONE (OUTPATIENT)
Dept: BEHAVIORAL HEALTH | Facility: CLINIC | Age: 35
End: 2025-06-17
Payer: MEDICARE

## 2025-06-17 DIAGNOSIS — F90.2 ATTENTION DEFICIT HYPERACTIVITY DISORDER (ADHD), COMBINED TYPE: Primary | ICD-10-CM

## 2025-06-17 RX ORDER — LISDEXAMFETAMINE DIMESYLATE 60 MG/1
60 CAPSULE ORAL EVERY MORNING
Qty: 30 CAPSULE | Refills: 0 | Status: SHIPPED | OUTPATIENT
Start: 2025-08-16 | End: 2025-09-15

## 2025-06-17 RX ORDER — LISDEXAMFETAMINE DIMESYLATE 60 MG/1
60 CAPSULE ORAL EVERY MORNING
Qty: 30 CAPSULE | Refills: 0 | Status: SHIPPED | OUTPATIENT
Start: 2025-07-17 | End: 2025-08-16

## 2025-06-17 RX ORDER — LISDEXAMFETAMINE DIMESYLATE 60 MG/1
60 CAPSULE ORAL EVERY MORNING
Qty: 30 CAPSULE | Refills: 0 | Status: SHIPPED | OUTPATIENT
Start: 2025-06-17 | End: 2025-07-17

## 2025-07-11 NOTE — PROGRESS NOTES
Lvm with a Dominik Dias who is a provider of the patient, who called the office today at approx. 1:10 to talk about the patient. Not listed EPIC, but also did not leave a reason for the call. Called [phone] and LVM to call the main office at [phone].

## 2025-07-14 ENCOUNTER — TELEPHONE (OUTPATIENT)
Dept: BEHAVIORAL HEALTH | Facility: CLINIC | Age: 35
End: 2025-07-14
Payer: MEDICARE

## 2025-08-21 ENCOUNTER — APPOINTMENT (OUTPATIENT)
Dept: BEHAVIORAL HEALTH | Facility: CLINIC | Age: 35
End: 2025-08-21
Payer: MEDICARE

## 2025-08-21 DIAGNOSIS — F33.1 MODERATE EPISODE OF RECURRENT MAJOR DEPRESSIVE DISORDER: ICD-10-CM

## 2025-08-21 DIAGNOSIS — F41.1 GAD (GENERALIZED ANXIETY DISORDER): ICD-10-CM

## 2025-08-21 DIAGNOSIS — F34.1 PERSISTENT DEPRESSIVE DISORDER: Primary | ICD-10-CM

## 2025-08-21 DIAGNOSIS — F42.8 OBSESSIVE THINKING: ICD-10-CM

## 2025-08-21 DIAGNOSIS — G47.9 SLEEP DIFFICULTIES: ICD-10-CM

## 2025-08-21 DIAGNOSIS — F43.10 COMPLEX POSTTRAUMATIC STRESS DISORDER: ICD-10-CM

## 2025-08-21 DIAGNOSIS — F90.2 ATTENTION DEFICIT HYPERACTIVITY DISORDER (ADHD), COMBINED TYPE: ICD-10-CM

## 2025-08-21 DIAGNOSIS — F31.76 BIPOLAR DISORDER, IN FULL REMISSION, MOST RECENT EPISODE DEPRESSED (MULTI): ICD-10-CM

## 2025-08-21 PROCEDURE — 99214 OFFICE O/P EST MOD 30 MIN: CPT | Performed by: NURSE PRACTITIONER

## 2025-08-21 PROCEDURE — G2211 COMPLEX E/M VISIT ADD ON: HCPCS | Performed by: NURSE PRACTITIONER

## 2025-08-21 RX ORDER — DULOXETIN HYDROCHLORIDE 30 MG/1
90 CAPSULE, DELAYED RELEASE ORAL DAILY
Qty: 90 CAPSULE | Refills: 11 | Status: SHIPPED | OUTPATIENT
Start: 2025-08-22 | End: 2026-08-22

## 2025-08-21 ASSESSMENT — ANXIETY QUESTIONNAIRES
IF YOU CHECKED OFF ANY PROBLEMS ON THIS QUESTIONNAIRE, HOW DIFFICULT HAVE THESE PROBLEMS MADE IT FOR YOU TO DO YOUR WORK, TAKE CARE OF THINGS AT HOME, OR GET ALONG WITH OTHER PEOPLE: SOMEWHAT DIFFICULT
4. TROUBLE RELAXING: SEVERAL DAYS
1. FEELING NERVOUS, ANXIOUS, OR ON EDGE: NEARLY EVERY DAY
7. FEELING AFRAID AS IF SOMETHING AWFUL MIGHT HAPPEN: NOT AT ALL
GAD7 TOTAL SCORE: 9
3. WORRYING TOO MUCH ABOUT DIFFERENT THINGS: NOT AT ALL
6. BECOMING EASILY ANNOYED OR IRRITABLE: MORE THAN HALF THE DAYS
2. NOT BEING ABLE TO STOP OR CONTROL WORRYING: NEARLY EVERY DAY
5. BEING SO RESTLESS THAT IT IS HARD TO SIT STILL: NOT AT ALL

## 2025-08-21 ASSESSMENT — PATIENT HEALTH QUESTIONNAIRE - PHQ9
8. MOVING OR SPEAKING SO SLOWLY THAT OTHER PEOPLE COULD HAVE NOTICED. OR THE OPPOSITE, BEING SO FIGETY OR RESTLESS THAT YOU HAVE BEEN MOVING AROUND A LOT MORE THAN USUAL: NOT AT ALL
9. THOUGHTS THAT YOU WOULD BE BETTER OFF DEAD, OR OF HURTING YOURSELF: SEVERAL DAYS
6. FEELING BAD ABOUT YOURSELF - OR THAT YOU ARE A FAILURE OR HAVE LET YOURSELF OR YOUR FAMILY DOWN: NEARLY EVERY DAY
7. TROUBLE CONCENTRATING ON THINGS, SUCH AS READING THE NEWSPAPER OR WATCHING TELEVISION: NOT AT ALL
1. LITTLE INTEREST OR PLEASURE IN DOING THINGS: NOT AT ALL
2. FEELING DOWN, DEPRESSED OR HOPELESS: MORE THAN HALF THE DAYS
4. FEELING TIRED OR HAVING LITTLE ENERGY: NEARLY EVERY DAY
10. IF YOU CHECKED OFF ANY PROBLEMS, HOW DIFFICULT HAVE THESE PROBLEMS MADE IT FOR YOU TO DO YOUR WORK, TAKE CARE OF THINGS AT HOME, OR GET ALONG WITH OTHER PEOPLE: SOMEWHAT DIFFICULT
5. POOR APPETITE OR OVEREATING: NEARLY EVERY DAY
3. TROUBLE FALLING OR STAYING ASLEEP OR SLEEPING TOO MUCH: MORE THAN HALF THE DAYS

## 2025-08-24 RX ORDER — LISDEXAMFETAMINE DIMESYLATE 60 MG/1
60 CAPSULE ORAL EVERY MORNING
Qty: 30 CAPSULE | Refills: 0 | Status: SHIPPED | OUTPATIENT
Start: 2025-09-15 | End: 2025-10-15

## 2025-08-24 RX ORDER — LISDEXAMFETAMINE DIMESYLATE 60 MG/1
60 CAPSULE ORAL EVERY MORNING
Qty: 30 CAPSULE | Refills: 0 | Status: SHIPPED | OUTPATIENT
Start: 2025-10-15 | End: 2025-11-14

## 2025-08-24 RX ORDER — LISDEXAMFETAMINE DIMESYLATE 60 MG/1
60 CAPSULE ORAL EVERY MORNING
Qty: 30 CAPSULE | Refills: 0 | Status: SHIPPED | OUTPATIENT
Start: 2025-11-14 | End: 2025-12-14

## 2025-10-20 ENCOUNTER — APPOINTMENT (OUTPATIENT)
Dept: BEHAVIORAL HEALTH | Facility: CLINIC | Age: 35
End: 2025-10-20
Payer: MEDICARE